# Patient Record
Sex: FEMALE | Race: OTHER | NOT HISPANIC OR LATINO | Employment: STUDENT | URBAN - METROPOLITAN AREA
[De-identification: names, ages, dates, MRNs, and addresses within clinical notes are randomized per-mention and may not be internally consistent; named-entity substitution may affect disease eponyms.]

---

## 2023-03-07 PROBLEM — F41.1 ANXIETY STATE: Status: ACTIVE | Noted: 2021-10-20

## 2023-03-07 PROBLEM — J02.9 ACUTE PHARYNGITIS: Status: RESOLVED | Noted: 2023-03-07 | Resolved: 2023-03-07

## 2023-03-07 PROBLEM — L70.9 ACNE: Status: ACTIVE | Noted: 2022-10-11

## 2023-03-07 PROBLEM — R61 GENERALIZED HYPERHIDROSIS: Status: ACTIVE | Noted: 2021-10-20

## 2023-03-07 PROBLEM — F90.0 ATTENTION DEFICIT HYPERACTIVITY DISORDER, PREDOMINANTLY INATTENTIVE TYPE: Status: ACTIVE | Noted: 2021-10-20

## 2023-03-07 RX ORDER — DEXTROMETHORPHAN HB/DOXYLAMINE 15-6.25/15
1 SOLUTION, ORAL ORAL DAILY
COMMUNITY
Start: 2022-11-15

## 2023-03-07 RX ORDER — ALUMINUM CHLORIDE 20 %
SOLUTION, NON-ORAL TOPICAL DAILY
COMMUNITY
Start: 2021-10-20

## 2023-03-07 RX ORDER — DEXTROAMPHETAMINE SACCHARATE, AMPHETAMINE ASPARTATE, DEXTROAMPHETAMINE SULFATE AND AMPHETAMINE SULFATE 2.5; 2.5; 2.5; 2.5 MG/1; MG/1; MG/1; MG/1
1 TABLET ORAL 2 TIMES DAILY
COMMUNITY
End: 2023-03-16 | Stop reason: SDUPTHER

## 2023-03-07 RX ORDER — CLINDAMYCIN 1 G/10ML
GEL TOPICAL 2 TIMES DAILY
COMMUNITY
Start: 2022-10-11

## 2023-03-07 RX ORDER — TRAZODONE HYDROCHLORIDE 50 MG/1
50 TABLET ORAL NIGHTLY
COMMUNITY
Start: 2021-10-20 | End: 2023-09-20 | Stop reason: SDUPTHER

## 2023-03-07 RX ORDER — ALBUTEROL SULFATE 0.83 MG/ML
2.5 SOLUTION RESPIRATORY (INHALATION) EVERY 4 HOURS PRN
COMMUNITY
Start: 2022-11-17

## 2023-03-07 RX ORDER — CITALOPRAM 20 MG/1
20 TABLET, FILM COATED ORAL DAILY
COMMUNITY
Start: 2022-03-24 | End: 2023-03-16 | Stop reason: SDUPTHER

## 2023-03-07 RX ORDER — DEXTROAMPHETAMINE SACCHARATE, AMPHETAMINE ASPARTATE, DEXTROAMPHETAMINE SULFATE AND AMPHETAMINE SULFATE 2.5; 2.5; 2.5; 2.5 MG/1; MG/1; MG/1; MG/1
1 TABLET ORAL 2 TIMES DAILY
OUTPATIENT
Start: 2023-03-07

## 2023-03-07 NOTE — TELEPHONE ENCOUNTER
Bassem Naik, I'll need to see you for a telehealth visit to refill this controlled med.  My staff will help set this up for you.

## 2023-03-16 ENCOUNTER — TELEMEDICINE (OUTPATIENT)
Dept: PRIMARY CARE | Facility: CLINIC | Age: 28
End: 2023-03-16
Payer: COMMERCIAL

## 2023-03-16 DIAGNOSIS — F90.0 ATTENTION DEFICIT HYPERACTIVITY DISORDER, PREDOMINANTLY INATTENTIVE TYPE: Primary | ICD-10-CM

## 2023-03-16 PROCEDURE — 99213 OFFICE O/P EST LOW 20 MIN: CPT | Performed by: FAMILY MEDICINE

## 2023-03-16 RX ORDER — DEXTROAMPHETAMINE SACCHARATE, AMPHETAMINE ASPARTATE, DEXTROAMPHETAMINE SULFATE AND AMPHETAMINE SULFATE 2.5; 2.5; 2.5; 2.5 MG/1; MG/1; MG/1; MG/1
1 TABLET ORAL 2 TIMES DAILY
Qty: 60 TABLET | Refills: 0 | Status: SHIPPED | OUTPATIENT
Start: 2023-03-16 | End: 2023-03-20 | Stop reason: RX

## 2023-03-16 RX ORDER — DEXTROAMPHETAMINE SACCHARATE, AMPHETAMINE ASPARTATE, DEXTROAMPHETAMINE SULFATE AND AMPHETAMINE SULFATE 2.5; 2.5; 2.5; 2.5 MG/1; MG/1; MG/1; MG/1
1 TABLET ORAL 2 TIMES DAILY
Qty: 60 TABLET | Refills: 0 | Status: SHIPPED | OUTPATIENT
Start: 2023-04-15 | End: 2023-03-20 | Stop reason: SDUPTHER

## 2023-03-16 RX ORDER — DEXTROAMPHETAMINE SACCHARATE, AMPHETAMINE ASPARTATE, DEXTROAMPHETAMINE SULFATE AND AMPHETAMINE SULFATE 2.5; 2.5; 2.5; 2.5 MG/1; MG/1; MG/1; MG/1
1 TABLET ORAL 2 TIMES DAILY
Qty: 60 TABLET | Refills: 0 | Status: SHIPPED | OUTPATIENT
Start: 2023-06-15 | End: 2023-03-20 | Stop reason: SDUPTHER

## 2023-03-16 RX ORDER — CITALOPRAM 20 MG/1
20 TABLET, FILM COATED ORAL DAILY
Qty: 90 TABLET | Refills: 3 | Status: SHIPPED | OUTPATIENT
Start: 2023-03-16 | End: 2023-09-20 | Stop reason: SDUPTHER

## 2023-03-16 NOTE — PROGRESS NOTES
Subjective   Patient ID: Heena Varner is a 28 y.o. female who presents for No chief complaint on file..  HPI patient ADD medications she has been out for couple weeks now.  Harder time focusing in class.  Reports acting impulsively and dealing with hyperactivity.    Current Outpatient Medications on File Prior to Visit   Medication Sig Dispense Refill    albuterol 2.5 mg /3 mL (0.083 %) nebulizer solution Take 3 mL (2.5 mg) by nebulization every 4 hours if needed (Asthma).      amphetamine-dextroamphetamine (Adderall) 10 mg tablet Take 1 tablet (10 mg) by mouth in the morning and 1 tablet (10 mg) before bedtime.      citalopram (CeleXA) 20 mg tablet Take 1 tablet (20 mg) by mouth once daily.      Clindagel 1 % gel, once daily Apply topically 2 times a day.      Drysol Dab-O-Matic 20 % external solution Apply topically once daily. apply to axilla      loratadine-pseudoephedrine (Loratadine-D)  mg 24 hr tablet Take 1 tablet by mouth once daily.      traZODone (Desyrel) 50 mg tablet Take 1 tablet (50 mg) by mouth once daily at bedtime.       No current facility-administered medications on file prior to visit.      This is a videochat visit.  Obtained consent from patient to have a video chat.  Total time spent in video chat was 8 mins.    Physical exam:  GEN: well appearing patient, no acute distress  HEENT: normal appearing sclera   NEURO: CN appear intact grossly  PSYCH: answering questions appropriately, normal mood    Review of Systems    Objective   Physical Exam    No visits with results within 2 Month(s) from this visit.   Latest known visit with results is:   [facility]acy Encounter on 12/05/2022   Component Date Value Ref Range Status    Flu A Result 12/05/2022 NOT DETECTED  Not Detected Final    Comment:  Respiratory virus testing is performed routinely by PCR    for Influenza A/B and RSV. If Influenza and RSV PCR are    negative, testing for parainfluenza 1,2,3 viruses and    adenovirus is routinely  performed for oncology inpatients    and intensive care unit patients at [facility] and is available   on request on other patients by calling Laboratory Client   Services at [phone].   Not Detected results do not preclude Influenza A/B or RSV   infections since the adequacy of sample collection or low   viral burden may impact the clinical sensitivity of this   test method.      Flu B Result 12/05/2022 NOT DETECTED  Not Detected Final    Comment:  Respiratory virus testing is performed routinely by PCR    for Influenza A/B and RSV. If Influenza and RSV PCR are    negative, testing for parainfluenza 1,2,3 viruses and    adenovirus is routinely performed for oncology inpatients    and intensive care unit patients at [facility] and is available   on request on other patients by calling Laboratory Client   Services at [phone]   Not Detected results do not preclude Influenza A/B or RSV   infections since the adequacy of sample collection or low   viral burden may impact the clinical sensitivity of this   test method.      SARS-COV-2 RESULT 12/05/2022 NOT DETECTED  Not Detected Final    Comment: .  This assay is designed to detect the ORF1a/b and E genes of SARS-CoV-2 via   nucleic acid amplification. A Not Detected result does not preclude   2019-nCoV infection since the adequacy of sample collection and/or low viral   burden may result in presence of viral nucleic acids below the clinical   sensitivity of this test method.     Fact sheet for providers: https://www.fda.gov/media/653152/download   Fact sheet for patients: https://www.fda.gov/media/213389/download     This test has received FDA Emergency Use Authorization (EUA) and has been   verified for use by [facility] ([facility]).   This test is only authorized for the duration of time that circumstances   exist to justify the authorization of the emergency use of in vitro   diagnostic tests for the detection of SARS-CoV-2 virus and/or  diagnosis of   COVID-19 infection under section 564(b)(1) of the Act, 21 U.S.C.   360bbb-3(b)(1), unless the authorization is terminated or revoked sooner.                               [facility] is certified under CLIA-88 as   qualified to perform high complexity testing. Testing is performed in the   [facility] laboratories located at [address] [place] [address].         Assessment/Plan   Problem List Items Addressed This Visit          Other    Attention deficit hyperactivity disorder, predominantly inattentive type - Primary    Relevant Medications    amphetamine-dextroamphetamine (Adderall) 10 mg tablet    amphetamine-dextroamphetamine (Adderall) 10 mg tablet (Start on 4/15/2023)    amphetamine-dextroamphetamine (Adderall) 10 mg tablet (Start on 6/15/2023)    citalopram (CeleXA) 20 mg tablet   I personally reviewed the OARRS report for this patient. I have considered the risks of abuse, dependence, addiction, and diversion. I believe that it is clinically appropriate for this patient to be prescribed this medication based on documented diagnosis.     Patient is doing well.  Refilled pts meds.  Follow up in 3 mo.

## 2023-03-20 ENCOUNTER — TELEPHONE (OUTPATIENT)
Dept: PRIMARY CARE | Facility: CLINIC | Age: 28
End: 2023-03-20
Payer: COMMERCIAL

## 2023-03-20 ENCOUNTER — NURSE TRIAGE (OUTPATIENT)
Dept: PRIMARY CARE | Facility: CLINIC | Age: 28
End: 2023-03-20
Payer: COMMERCIAL

## 2023-03-20 DIAGNOSIS — F90.0 ATTENTION DEFICIT HYPERACTIVITY DISORDER, PREDOMINANTLY INATTENTIVE TYPE: ICD-10-CM

## 2023-03-20 RX ORDER — DEXTROAMPHETAMINE SACCHARATE, AMPHETAMINE ASPARTATE, DEXTROAMPHETAMINE SULFATE AND AMPHETAMINE SULFATE 2.5; 2.5; 2.5; 2.5 MG/1; MG/1; MG/1; MG/1
1 TABLET ORAL 2 TIMES DAILY
Qty: 60 TABLET | Refills: 0 | Status: SHIPPED | OUTPATIENT
Start: 2023-03-20 | End: 2023-06-21 | Stop reason: ALTCHOICE

## 2023-03-20 RX ORDER — DEXTROAMPHETAMINE SACCHARATE, AMPHETAMINE ASPARTATE, DEXTROAMPHETAMINE SULFATE AND AMPHETAMINE SULFATE 2.5; 2.5; 2.5; 2.5 MG/1; MG/1; MG/1; MG/1
10 TABLET ORAL DAILY
Qty: 30 TABLET | Refills: 0 | Status: SHIPPED | OUTPATIENT
Start: 2023-04-19 | End: 2023-03-20 | Stop reason: SDUPTHER

## 2023-03-20 RX ORDER — DEXTROAMPHETAMINE SACCHARATE, AMPHETAMINE ASPARTATE, DEXTROAMPHETAMINE SULFATE AND AMPHETAMINE SULFATE 2.5; 2.5; 2.5; 2.5 MG/1; MG/1; MG/1; MG/1
10 TABLET ORAL DAILY
Qty: 30 TABLET | Refills: 0 | Status: SHIPPED | OUTPATIENT
Start: 2023-05-19 | End: 2023-03-20 | Stop reason: SDUPTHER

## 2023-03-20 RX ORDER — DEXTROAMPHETAMINE SACCHARATE, AMPHETAMINE ASPARTATE, DEXTROAMPHETAMINE SULFATE AND AMPHETAMINE SULFATE 2.5; 2.5; 2.5; 2.5 MG/1; MG/1; MG/1; MG/1
1 TABLET ORAL 2 TIMES DAILY
Qty: 60 TABLET | Refills: 0 | Status: SHIPPED | OUTPATIENT
Start: 2023-06-15 | End: 2023-06-21 | Stop reason: SDUPTHER

## 2023-03-20 RX ORDER — DEXTROAMPHETAMINE SACCHARATE, AMPHETAMINE ASPARTATE, DEXTROAMPHETAMINE SULFATE AND AMPHETAMINE SULFATE 2.5; 2.5; 2.5; 2.5 MG/1; MG/1; MG/1; MG/1
10 TABLET ORAL DAILY
Qty: 30 TABLET | Refills: 0 | Status: SHIPPED | OUTPATIENT
Start: 2023-05-19 | End: 2023-06-21 | Stop reason: ALTCHOICE

## 2023-03-20 RX ORDER — DEXTROAMPHETAMINE SACCHARATE, AMPHETAMINE ASPARTATE, DEXTROAMPHETAMINE SULFATE AND AMPHETAMINE SULFATE 2.5; 2.5; 2.5; 2.5 MG/1; MG/1; MG/1; MG/1
1 TABLET ORAL 2 TIMES DAILY
Qty: 60 TABLET | Refills: 0 | Status: CANCELLED | OUTPATIENT
Start: 2023-03-20 | End: 2023-04-19

## 2023-03-20 RX ORDER — DEXTROAMPHETAMINE SACCHARATE, AMPHETAMINE ASPARTATE, DEXTROAMPHETAMINE SULFATE AND AMPHETAMINE SULFATE 2.5; 2.5; 2.5; 2.5 MG/1; MG/1; MG/1; MG/1
10 TABLET ORAL DAILY
Qty: 30 TABLET | Refills: 0 | Status: SHIPPED | OUTPATIENT
Start: 2023-03-20 | End: 2023-06-21 | Stop reason: SDUPTHER

## 2023-03-20 RX ORDER — DEXTROAMPHETAMINE SACCHARATE, AMPHETAMINE ASPARTATE, DEXTROAMPHETAMINE SULFATE AND AMPHETAMINE SULFATE 2.5; 2.5; 2.5; 2.5 MG/1; MG/1; MG/1; MG/1
1 TABLET ORAL 2 TIMES DAILY
Qty: 60 TABLET | Refills: 0 | Status: SHIPPED | OUTPATIENT
Start: 2023-03-20 | End: 2023-03-20 | Stop reason: SDUPTHER

## 2023-03-20 RX ORDER — DEXTROAMPHETAMINE SACCHARATE, AMPHETAMINE ASPARTATE, DEXTROAMPHETAMINE SULFATE AND AMPHETAMINE SULFATE 2.5; 2.5; 2.5; 2.5 MG/1; MG/1; MG/1; MG/1
10 TABLET ORAL DAILY
Qty: 30 TABLET | Refills: 0 | Status: SHIPPED | OUTPATIENT
Start: 2023-04-19 | End: 2023-06-21 | Stop reason: ALTCHOICE

## 2023-03-20 RX ORDER — DEXTROAMPHETAMINE SACCHARATE, AMPHETAMINE ASPARTATE, DEXTROAMPHETAMINE SULFATE AND AMPHETAMINE SULFATE 2.5; 2.5; 2.5; 2.5 MG/1; MG/1; MG/1; MG/1
1 TABLET ORAL 2 TIMES DAILY
Qty: 60 TABLET | Refills: 0 | Status: SHIPPED | OUTPATIENT
Start: 2023-04-15 | End: 2023-06-21 | Stop reason: SDUPTHER

## 2023-03-20 NOTE — TELEPHONE ENCOUNTER
Pt called back stating she found that [facility] has her regular rx and would like all 3 meds her, orignal rx's to go there. Giovanny SEVILLA

## 2023-03-20 NOTE — TELEPHONE ENCOUNTER
Pt wants Adderral - DIEGO, not amphetamine- dex, it MUST say Adderall only, no generic name on RX. It needs to say DIEGO Adderall. ROEL CARRILLO

## 2023-03-20 NOTE — TELEPHONE ENCOUNTER
Patient states her pharmacy does not have Generic Adderall 10 mg. Patient is asking if [facility] could send it DIEGO to [facility] [place] on [facility]

## 2023-03-27 ENCOUNTER — APPOINTMENT (OUTPATIENT)
Dept: PRIMARY CARE | Facility: CLINIC | Age: 28
End: 2023-03-27
Payer: COMMERCIAL

## 2023-06-21 ENCOUNTER — OFFICE VISIT (OUTPATIENT)
Dept: PRIMARY CARE | Facility: CLINIC | Age: 28
End: 2023-06-21
Payer: COMMERCIAL

## 2023-06-21 VITALS
DIASTOLIC BLOOD PRESSURE: 74 MMHG | WEIGHT: 116 LBS | SYSTOLIC BLOOD PRESSURE: 122 MMHG | BODY MASS INDEX: 19.91 KG/M2 | HEART RATE: 82 BPM | TEMPERATURE: 98.5 F

## 2023-06-21 DIAGNOSIS — K64.1 GRADE II HEMORRHOIDS: Primary | ICD-10-CM

## 2023-06-21 DIAGNOSIS — F90.0 ATTENTION DEFICIT HYPERACTIVITY DISORDER, PREDOMINANTLY INATTENTIVE TYPE: ICD-10-CM

## 2023-06-21 PROBLEM — F32.A DEPRESSION: Status: ACTIVE | Noted: 2023-06-21

## 2023-06-21 PROCEDURE — 1036F TOBACCO NON-USER: CPT | Performed by: FAMILY MEDICINE

## 2023-06-21 PROCEDURE — 99203 OFFICE O/P NEW LOW 30 MIN: CPT | Performed by: FAMILY MEDICINE

## 2023-06-21 RX ORDER — DEXTROAMPHETAMINE SACCHARATE, AMPHETAMINE ASPARTATE, DEXTROAMPHETAMINE SULFATE AND AMPHETAMINE SULFATE 2.5; 2.5; 2.5; 2.5 MG/1; MG/1; MG/1; MG/1
1 TABLET ORAL 2 TIMES DAILY
Qty: 60 TABLET | Refills: 0 | Status: SHIPPED | OUTPATIENT
Start: 2023-07-20 | End: 2023-09-20 | Stop reason: ALTCHOICE

## 2023-06-21 RX ORDER — LEVONORGESTREL AND ETHINYL ESTRADIOL 0.1-0.02MG
KIT ORAL
COMMUNITY
Start: 2022-02-10

## 2023-06-21 RX ORDER — DEXTROAMPHETAMINE SACCHARATE, AMPHETAMINE ASPARTATE, DEXTROAMPHETAMINE SULFATE AND AMPHETAMINE SULFATE 2.5; 2.5; 2.5; 2.5 MG/1; MG/1; MG/1; MG/1
10 TABLET ORAL DAILY
Qty: 30 TABLET | Refills: 0 | Status: SHIPPED | OUTPATIENT
Start: 2023-08-19 | End: 2023-09-20 | Stop reason: ALTCHOICE

## 2023-06-21 RX ORDER — TAZAROTENE 1 MG/G
GEL TOPICAL
COMMUNITY
Start: 2023-05-23

## 2023-06-21 RX ORDER — SULFACETAMIDE SODIUM, SULFUR 100; 50 MG/G; MG/G
EMULSION TOPICAL
COMMUNITY
Start: 2023-05-23

## 2023-06-21 RX ORDER — DEXTROAMPHETAMINE SACCHARATE, AMPHETAMINE ASPARTATE, DEXTROAMPHETAMINE SULFATE AND AMPHETAMINE SULFATE 2.5; 2.5; 2.5; 2.5 MG/1; MG/1; MG/1; MG/1
1 TABLET ORAL 2 TIMES DAILY
Qty: 60 TABLET | Refills: 0 | Status: SHIPPED | OUTPATIENT
Start: 2023-06-21 | End: 2023-09-20 | Stop reason: ALTCHOICE

## 2023-06-21 NOTE — PROGRESS NOTES
Subjective   Patient ID: Heena Varner is a 28 y.o. female who presents for ADD (Recheck.) and ER Follow-up ([facility] on 3/30 Re: Hemorrhoid, rectal bleeding).    Patient ADD medications she has been out for couple weeks now.  Harder time focusing in class.  Reports acting impulsively and dealing with hyperactivity.    Hemorrhoid: has been having multiple episodes of bleeding from a hemorrhoid.    Current Outpatient Medications on File Prior to Visit   Medication Sig Dispense Refill    albuterol 2.5 mg /3 mL (0.083 %) nebulizer solution Take 3 mL (2.5 mg) by nebulization every 4 hours if needed (Asthma).      Aviane 0.1-20 mg-mcg tablet Take by mouth.      citalopram (CeleXA) 20 mg tablet Take 1 tablet (20 mg) by mouth once daily. 90 tablet 3    sulfacetamide sodium-sulfur (Avar, Plexion) 10-5 % (w/w) topical emulsion wash face twice daily as directed      tazarotene (Tazorac) 0.1 % gel apply to face every night sparingly      traZODone (Desyrel) 50 mg tablet Take 1 tablet (50 mg) by mouth once daily at bedtime.      [DISCONTINUED] amphetamine-dextroamphetamine (Adderall) 10 mg tablet Take 1 tablet (10 mg) by mouth in the morning and 1 tablet (10 mg) before bedtime. Do not start before Carmen 15, 2023. 60 tablet 0    Clindagel 1 % gel, once daily Apply topically 2 times a day.      Drysol Dab-O-Matic 20 % external solution Apply topically once daily. apply to axilla      loratadine-pseudoephedrine (Loratadine-D)  mg 24 hr tablet Take 1 tablet by mouth once daily.      [DISCONTINUED] amphetamine-dextroamphetamine (Adderall) 10 mg tablet Take 1 tablet (10 mg) by mouth in the morning and 1 tablet (10 mg) before bedtime. Do not start before April 15, 2023. 60 tablet 0    [DISCONTINUED] amphetamine-dextroamphetamine (Adderall) 10 mg tablet Take 1 tablet (10 mg) by mouth once daily. 30 tablet 0    [DISCONTINUED] amphetamine-dextroamphetamine (Adderall) 10 mg tablet Take 1 tablet (10 mg) by mouth in the morning  and 1 tablet (10 mg) before bedtime. 60 tablet 0    [DISCONTINUED] amphetamine-dextroamphetamine (Adderall) 10 mg tablet Take 1 tablet (10 mg) by mouth once daily. Do not start before April 19, 2023. 30 tablet 0    [DISCONTINUED] amphetamine-dextroamphetamine (Adderall) 10 mg tablet Take 1 tablet (10 mg) by mouth once daily. Do not start before May 19, 2023. 30 tablet 0     No current facility-administered medications on file prior to visit.        Vitals:    06/21/23 0847   BP: 122/74   Pulse: 82   Temp: 36.9 °C (98.5 °F)       Review of Systems   All other systems reviewed and are negative.      Objective     Physical Exam  Constitutional:       Appearance: Normal appearance. She is well-developed.   HENT:      Head: Atraumatic.   Cardiovascular:      Rate and Rhythm: Normal rate and regular rhythm.      Heart sounds: Normal heart sounds. No murmur heard.  Pulmonary:      Effort: Pulmonary effort is normal.      Breath sounds: Normal breath sounds.   Abdominal:      General: Bowel sounds are normal.      Palpations: Abdomen is soft.   Skin:     General: Skin is warm.   Neurological:      General: No focal deficit present.      Mental Status: She is alert.   Psychiatric:         Mood and Affect: Mood normal.         No visits with results within 2 Month(s) from this visit.   Latest known visit with results is:   Legacy Encounter on 12/05/2022   Component Date Value Ref Range Status    Flu A Result 12/05/2022 NOT DETECTED  Not Detected Final    Comment:  Respiratory virus testing is performed routinely by PCR    for Influenza A/B and RSV. If Influenza and RSV PCR are    negative, testing for parainfluenza 1,2,3 viruses and    adenovirus is routinely performed for oncology inpatients    and intensive care unit patients at [facility] and is available   on request on other patients by calling Laboratory Client   Services at [phone].   Not Detected results do not preclude Influenza A/B or RSV   infections since the  adequacy of sample collection or low   viral burden may impact the clinical sensitivity of this   test method.      Flu B Result 12/05/2022 NOT DETECTED  Not Detected Final    Comment:  Respiratory virus testing is performed routinely by PCR    for Influenza A/B and RSV. If Influenza and RSV PCR are    negative, testing for parainfluenza 1,2,3 viruses and    adenovirus is routinely performed for oncology inpatients    and intensive care unit patients at [facility] and is available   on request on other patients by calling Laboratory Client   Services at [phone]   Not Detected results do not preclude Influenza A/B or RSV   infections since the adequacy of sample collection or low   viral burden may impact the clinical sensitivity of this   test method.      SARS-COV-2 RESULT 12/05/2022 NOT DETECTED  Not Detected Final    Comment: .  This assay is designed to detect the ORF1a/b and E genes of SARS-CoV-2 via   nucleic acid amplification. A Not Detected result does not preclude   2019-nCoV infection since the adequacy of sample collection and/or low viral   burden may result in presence of viral nucleic acids below the clinical   sensitivity of this test method.     Fact sheet for providers: https://www.fda.gov/media/750662/download   Fact sheet for patients: https://www.fda.gov/media/877724/download     This test has received FDA Emergency Use Authorization (EUA) and has been   verified for use by [facility] ([facility]).   This test is only authorized for the duration of time that circumstances   exist to justify the authorization of the emergency use of in vitro   diagnostic tests for the detection of SARS-CoV-2 virus and/or diagnosis of   COVID-19 infection under section 564(b)(1) of the Act, 21 U.S.C.   360bbb-3(b)(1), unless the authorization is terminated or revoked sooner.                               [facility] is certified under CLIA-88 as    qualified to perform high complexity testing. Testing is performed in the   [facility] laboratories located at [address] [place] [address].         Assessment/Plan   Problem List Items Addressed This Visit       Attention deficit hyperactivity disorder, predominantly inattentive type    Relevant Medications    amphetamine-dextroamphetamine (Adderall) 10 mg tablet    amphetamine-dextroamphetamine (Adderall) 10 mg tablet (Start on 7/20/2023)    amphetamine-dextroamphetamine (Adderall) 10 mg tablet (Start on 8/19/2023)     Other Visit Diagnoses       Grade II hemorrhoids    -  Primary    Relevant Orders    Referral to General Surgery          I personally reviewed the OARRS report for this patient. I have considered the risks of abuse, dependence, addiction, and diversion. I believe that it is clinically appropriate for this patient to be prescribed this medication based on documented diagnosis.     Patient is doing well.  Refilled pts meds.  Follow up in 3 mo.

## 2023-09-20 ENCOUNTER — TELEMEDICINE (OUTPATIENT)
Dept: PRIMARY CARE | Facility: CLINIC | Age: 28
End: 2023-09-20
Payer: COMMERCIAL

## 2023-09-20 DIAGNOSIS — F51.01 PRIMARY INSOMNIA: Primary | ICD-10-CM

## 2023-09-20 DIAGNOSIS — F90.0 ATTENTION DEFICIT HYPERACTIVITY DISORDER, PREDOMINANTLY INATTENTIVE TYPE: ICD-10-CM

## 2023-09-20 PROCEDURE — 99214 OFFICE O/P EST MOD 30 MIN: CPT | Performed by: FAMILY MEDICINE

## 2023-09-20 RX ORDER — DEXTROAMPHETAMINE SACCHARATE, AMPHETAMINE ASPARTATE, DEXTROAMPHETAMINE SULFATE AND AMPHETAMINE SULFATE 3.75; 3.75; 3.75; 3.75 MG/1; MG/1; MG/1; MG/1
15 TABLET ORAL
Qty: 60 TABLET | Refills: 0 | Status: SHIPPED | OUTPATIENT
Start: 2023-10-20 | End: 2023-11-07 | Stop reason: ALTCHOICE

## 2023-09-20 RX ORDER — DEXTROAMPHETAMINE SACCHARATE, AMPHETAMINE ASPARTATE, DEXTROAMPHETAMINE SULFATE AND AMPHETAMINE SULFATE 3.75; 3.75; 3.75; 3.75 MG/1; MG/1; MG/1; MG/1
15 TABLET ORAL
Qty: 60 TABLET | Refills: 0 | Status: SHIPPED | OUTPATIENT
Start: 2023-11-19 | End: 2023-12-13 | Stop reason: SDUPTHER

## 2023-09-20 RX ORDER — TRAZODONE HYDROCHLORIDE 50 MG/1
50 TABLET ORAL NIGHTLY
Qty: 90 TABLET | Refills: 1 | Status: SHIPPED | OUTPATIENT
Start: 2023-09-20 | End: 2024-04-08

## 2023-09-20 RX ORDER — DEXTROAMPHETAMINE SACCHARATE, AMPHETAMINE ASPARTATE, DEXTROAMPHETAMINE SULFATE AND AMPHETAMINE SULFATE 3.75; 3.75; 3.75; 3.75 MG/1; MG/1; MG/1; MG/1
15 TABLET ORAL
Qty: 60 TABLET | Refills: 0 | Status: SHIPPED | OUTPATIENT
Start: 2023-09-20 | End: 2023-11-07 | Stop reason: ALTCHOICE

## 2023-09-20 RX ORDER — CITALOPRAM 20 MG/1
20 TABLET, FILM COATED ORAL DAILY
Qty: 90 TABLET | Refills: 3 | Status: SHIPPED | OUTPATIENT
Start: 2023-09-20 | End: 2024-09-19

## 2023-09-20 NOTE — PROGRESS NOTES
Subjective   Patient ID: Heena Varner is a 28 y.o. female who presents for No chief complaint on file..    Patient ADD medications: Having a hard time focusing even when she takes medications.  Tends to run out at the end of the day.    Hemorrhoid: not discussed    This is a videochat visit.  Obtained consent from patient to have a video chat.  Total time spent in video chat was 8 mins.    Physical exam:  GEN: well appearing patient, no acute distress  HEENT: normal appearing sclera   NEURO: CN appear intact grossly  PSYCH: answering questions appropriately, normal mood      Current Outpatient Medications on File Prior to Visit   Medication Sig Dispense Refill    albuterol 2.5 mg /3 mL (0.083 %) nebulizer solution Take 3 mL (2.5 mg) by nebulization every 4 hours if needed (Asthma).      Aviane 0.1-20 mg-mcg tablet Take by mouth.      Clindagel 1 % gel, once daily Apply topically 2 times a day.      Drysol Dab-O-Matic 20 % external solution Apply topically once daily. apply to axilla      loratadine-pseudoephedrine (Loratadine-D)  mg 24 hr tablet Take 1 tablet by mouth once daily.      sulfacetamide sodium-sulfur (Avar, Plexion) 10-5 % (w/w) topical emulsion wash face twice daily as directed      tazarotene (Tazorac) 0.1 % gel apply to face every night sparingly      [DISCONTINUED] amphetamine-dextroamphetamine (Adderall) 10 mg tablet Take 1 tablet (10 mg) by mouth 2 times a day. 60 tablet 0    [DISCONTINUED] amphetamine-dextroamphetamine (Adderall) 10 mg tablet Take 1 tablet (10 mg) by mouth 2 times a day. Do not start before July 20, 2023. 60 tablet 0    [DISCONTINUED] amphetamine-dextroamphetamine (Adderall) 10 mg tablet Take 1 tablet (10 mg) by mouth once daily. Do not start before August 19, 2023. 30 tablet 0    [DISCONTINUED] citalopram (CeleXA) 20 mg tablet Take 1 tablet (20 mg) by mouth once daily. 90 tablet 3    [DISCONTINUED] traZODone (Desyrel) 50 mg tablet Take 1 tablet (50 mg) by mouth once daily  at bedtime.       No current facility-administered medications on file prior to visit.        There were no vitals filed for this visit.      Review of Systems   All other systems reviewed and are negative.      Objective     No visits with results within 2 Month(s) from this visit.   Latest known visit with results is:   Legacy Encounter on 12/05/2022   Component Date Value Ref Range Status    Flu A Result 12/05/2022 NOT DETECTED  Not Detected Final    Comment:  Respiratory virus testing is performed routinely by PCR    for Influenza A/B and RSV. If Influenza and RSV PCR are    negative, testing for parainfluenza 1,2,3 viruses and    adenovirus is routinely performed for oncology inpatients    and intensive care unit patients at [facility] and is available   on request on other patients by calling Laboratory Client   Services at [phone].   Not Detected results do not preclude Influenza A/B or RSV   infections since the adequacy of sample collection or low   viral burden may impact the clinical sensitivity of this   test method.      Flu B Result 12/05/2022 NOT DETECTED  Not Detected Final    Comment:  Respiratory virus testing is performed routinely by PCR    for Influenza A/B and RSV. If Influenza and RSV PCR are    negative, testing for parainfluenza 1,2,3 viruses and    adenovirus is routinely performed for oncology inpatients    and intensive care unit patients at [facility] and is available   on request on other patients by calling Laboratory Client   Services at [phone]   Not Detected results do not preclude Influenza A/B or RSV   infections since the adequacy of sample collection or low   viral burden may impact the clinical sensitivity of this   test method.      SARS-CoV-2 Result 12/05/2022 NOT DETECTED  Not Detected Final    Comment: .  This assay is designed to detect the ORF1a/b and E genes of SARS-CoV-2 via   nucleic acid amplification. A Not Detected result does not preclude   2019-nCoV infection  since the adequacy of sample collection and/or low viral   burden may result in presence of viral nucleic acids below the clinical   sensitivity of this test method.     Fact sheet for providers: https://www.fda.gov/media/958612/download   Fact sheet for patients: https://www.fda.gov/media/017179/download     This test has received FDA Emergency Use Authorization (EUA) and has been   verified for use by [facility] ([facility]).   This test is only authorized for the duration of time that circumstances   exist to justify the authorization of the emergency use of in vitro   diagnostic tests for the detection of SARS-CoV-2 virus and/or diagnosis of   COVID-19 infection under section 564(b)(1) of the Act, 21 U.S.C.   360bbb-3(b)(1), unless the authorization is terminated or revoked sooner.                               [facility] is certified under CLIA-88 as   qualified to perform high complexity testing. Testing is performed in the   [facility] laboratories located at [address] [place] [address].         Assessment/Plan   Problem List Items Addressed This Visit       Attention deficit hyperactivity disorder, predominantly inattentive type    Relevant Medications    amphetamine-dextroamphetamine (Adderall) 15 mg tablet    amphetamine-dextroamphetamine (Adderall) 15 mg tablet (Start on 10/20/2023)    amphetamine-dextroamphetamine (Adderall) 15 mg tablet (Start on 11/19/2023)    citalopram (CeleXA) 20 mg tablet     Other Visit Diagnoses       Primary insomnia    -  Primary    Relevant Medications    traZODone (Desyrel) 50 mg tablet          I personally reviewed the OARRS report for this patient. I have considered the risks of abuse, dependence, addiction, and diversion. I believe that it is clinically appropriate for this patient to be prescribed this medication based on documented diagnosis.     Inc to 15 mg BID.  Take some drug holidays.  Follow up  in 3 mo.

## 2023-11-07 ENCOUNTER — TELEPHONE (OUTPATIENT)
Dept: PRIMARY CARE | Facility: CLINIC | Age: 28
End: 2023-11-07
Payer: COMMERCIAL

## 2023-11-07 DIAGNOSIS — F90.0 ATTENTION DEFICIT HYPERACTIVITY DISORDER, PREDOMINANTLY INATTENTIVE TYPE: Primary | ICD-10-CM

## 2023-11-07 RX ORDER — DEXTROAMPHETAMINE SACCHARATE, AMPHETAMINE ASPARTATE, DEXTROAMPHETAMINE SULFATE AND AMPHETAMINE SULFATE 7.5; 7.5; 7.5; 7.5 MG/1; MG/1; MG/1; MG/1
15 TABLET ORAL
Qty: 30 TABLET | Refills: 0 | Status: SHIPPED | OUTPATIENT
Start: 2023-11-07 | End: 2023-12-12 | Stop reason: HOSPADM

## 2023-11-07 NOTE — TELEPHONE ENCOUNTER
Pt called and stated that pharmacy doesn't have generic adderall in 15mg, they said they can do 30mg once a day if that can be sent over.

## 2023-11-17 ENCOUNTER — OFFICE VISIT (OUTPATIENT)
Dept: SURGERY | Facility: CLINIC | Age: 28
End: 2023-11-17
Payer: COMMERCIAL

## 2023-11-17 VITALS
BODY MASS INDEX: 20.45 KG/M2 | DIASTOLIC BLOOD PRESSURE: 84 MMHG | SYSTOLIC BLOOD PRESSURE: 129 MMHG | WEIGHT: 119.8 LBS | HEIGHT: 64 IN | OXYGEN SATURATION: 99 % | HEART RATE: 101 BPM

## 2023-11-17 DIAGNOSIS — K64.4 INTERNAL AND EXTERNAL BLEEDING HEMORRHOIDS: ICD-10-CM

## 2023-11-17 DIAGNOSIS — K62.5 RECTAL BLEEDING: Primary | ICD-10-CM

## 2023-11-17 DIAGNOSIS — K64.8 INTERNAL AND EXTERNAL BLEEDING HEMORRHOIDS: ICD-10-CM

## 2023-11-17 PROCEDURE — 1036F TOBACCO NON-USER: CPT | Performed by: SURGERY

## 2023-11-17 PROCEDURE — 46600 DIAGNOSTIC ANOSCOPY SPX: CPT | Performed by: SURGERY

## 2023-11-17 PROCEDURE — 99214 OFFICE O/P EST MOD 30 MIN: CPT | Performed by: SURGERY

## 2023-11-17 RX ORDER — SODIUM CHLORIDE 9 MG/ML
100 INJECTION, SOLUTION INTRAVENOUS CONTINUOUS
Status: CANCELLED | OUTPATIENT
Start: 2023-11-17

## 2023-11-17 ASSESSMENT — ENCOUNTER SYMPTOMS
ANAL BLEEDING: 1
DIARRHEA: 0
VOMITING: 0
ABDOMINAL PAIN: 0
HEADACHES: 0
SHORTNESS OF BREATH: 0
COUGH: 0
BLOOD IN STOOL: 0
RECTAL PAIN: 0
DIZZINESS: 0
CONSTIPATION: 0
FEVER: 0
NAUSEA: 0
PALPITATIONS: 0
CHILLS: 0

## 2023-11-17 NOTE — PROGRESS NOTES
GENERAL SURGERY CLINIC NOTE    Heena Varner   1995   [id number]     History Of Present Illness  Heena Varner is a 28 y.o. female who presents to the office for follow up of rectal bleeding and hemorrhoids. She has had hemorrhoids since 8310-0055 and underwent colonoscopy at that time in [place] for evaluation of rectal bleeding. The only abnormalities she recall being noted were hemorrhoids. She has a history of IBS and alternates between very watery diarrhea and significant constipation, which has since improved. She also experiences occasional crampy abdominal pain. She typically has a small amount of blood with bowel movements, but earlier this year, the volume has increased significantly. She was previously evaluated 6/28/23 and the right posterior internal hemorrhoids were banded. The bleeding stopped for 3 weeks, and then resumed. She slept through her follow up appointment and then did not follow up due to her stepfather passing away. In the last 1-2 months, she has had significantly increased volume of bleeding even without having bowel movements.      Past Medical History  She has a past medical history of Acute pharyngitis (03/07/2023), Personal history of other diseases of the circulatory system, Personal history of other diseases of the digestive system, Personal history of other diseases of the respiratory system, and Personal history of other drug therapy.    Surgical History  She has a past surgical history that includes Other surgical history (09/07/2017) and Colonoscopy (09/12/2017).    Medications  Current Outpatient Medications on File Prior to Visit   Medication Sig Dispense Refill    albuterol 2.5 mg /3 mL (0.083 %) nebulizer solution Take 3 mL (2.5 mg) by nebulization every 4 hours if needed (Asthma).      amphetamine-dextroamphetamine (AdderalL) 30 mg tablet Take 0.5 tablets (15 mg) by mouth 2 times daily after breakfast and lunch. 30 tablet 0    Aviane 0.1-20 mg-mcg tablet Take by  mouth.      citalopram (CeleXA) 20 mg tablet Take 1 tablet (20 mg) by mouth once daily. 90 tablet 3    Clindagel 1 % gel, once daily Apply topically 2 times a day.      Drysol Dab-O-Matic 20 % external solution Apply topically once daily. apply to axilla      sulfacetamide sodium-sulfur (Avar, Plexion) 10-5 % (w/w) topical emulsion wash face twice daily as directed      tazarotene (Tazorac) 0.1 % gel apply to face every night sparingly      traZODone (Desyrel) 50 mg tablet Take 1 tablet (50 mg) by mouth once daily at bedtime. 90 tablet 1    [START ON 11/19/2023] amphetamine-dextroamphetamine (Adderall) 15 mg tablet Take 1 tablet (15 mg) by mouth 2 times daily after breakfast and lunch. Do not start before November 19, 2023. (Patient not taking: Reported on 11/17/2023 Do not start before November 19, 2023.) 60 tablet 0    loratadine-pseudoephedrine (Loratadine-D)  mg 24 hr tablet Take 1 tablet by mouth once daily.       No current facility-administered medications on file prior to visit.       Allergies  Bee venom protein (honey bee)     Social History  She reports that she has never smoked. She has never used smokeless tobacco. She reports that she does not currently use alcohol. She reports that she does not use drugs.    Family History  Family History   Problem Relation Name Age of Onset    Arthritis Mother Alidaelissa Villegas     Depression Mother Asagaryelissa Villegas     Alcohol abuse Father Gene Ramirez     Depression Father Gene Canisteo     Depression Brother Mitchell albright     Heart disease Mother's Brother Aldo Varner         Review of Systems   Constitutional:  Negative for chills and fever.   Respiratory:  Negative for cough and shortness of breath.    Cardiovascular:  Negative for chest pain and palpitations.   Gastrointestinal:  Positive for anal bleeding. Negative for abdominal pain, blood in stool, constipation, diarrhea, nausea, rectal pain and vomiting.   Neurological:  Negative for dizziness and  "headaches.   All other systems reviewed and are negative.      Last Recorded Vitals  Blood pressure 129/84, pulse 101, height 1.626 m (5' 4\"), weight 54.3 kg (119 lb 12.8 oz), SpO2 99 %.     Physical Exam  Constitutional:       General: She is not in acute distress.     Appearance: Normal appearance. She is not ill-appearing.   HENT:      Head: Normocephalic and atraumatic.   Cardiovascular:      Rate and Rhythm: Normal rate and regular rhythm.   Pulmonary:      Effort: Pulmonary effort is normal. No respiratory distress.      Breath sounds: Normal breath sounds.   Abdominal:      General: There is no distension.      Palpations: Abdomen is soft.      Tenderness: There is no abdominal tenderness. There is no guarding.   Genitourinary:     Comments: The examination was performed in the presence of a chaperone.    External rectal exam: very small, nonthrombosed right posterior external hemorrhoid, small, nonthrombosed right anterior external hemorrhoid.    MINDY: normal rectal tone, normal mucosa. No gross blood    Anoscopy: mild to moderate internal hemorrhoids in the right posterior group. Larger internal hemorrhoids in the right anterior and left lateral groups  Musculoskeletal:         General: No swelling.   Skin:     General: Skin is warm and dry.   Neurological:      Mental Status: She is alert and oriented to person, place, and time. Mental status is at baseline.   Psychiatric:         Mood and Affect: Mood normal.         Behavior: Behavior normal.       Assessment and Plan  28 y.o. female with bleeding internal hemorrhoids, likely from the right anterior group from her self examinations. I discussed undergoing repeat band ligation today vs operative intervention. Given the persistent nature of these hemorrhoids, the patient wanted to consider hemorrhoidectomy over banding. I discussed that I plan to excise the left lateral and right anterior internal hemorrhoids, and likely leave the external hemorrhoids in " place as they are asymptomatic. She was agreeable to the tentative plan. The risks and benefits of the procedure were discussed. Risks include COVID-19 transmission/infection, allergic reactions, heart or lung complications, bleeding, infection, injury to surrounding tissues (sphincters), additional planned or unplanned procedures required, nonresolution of symptoms, pain and opioid addiction. The patient expressed understanding and provided informed consent for surgery.     OR 12/12/23 for EUA, hemorrhoidectomy. Phone screening.    Sophie Calix MD, [facility]  General Surgery

## 2023-11-17 NOTE — H&P (VIEW-ONLY)
GENERAL SURGERY CLINIC NOTE    Heena Varner   1995   [id number]     History Of Present Illness  Heena Varner is a 28 y.o. female who presents to the office for follow up of rectal bleeding and hemorrhoids. She has had hemorrhoids since 3034-2072 and underwent colonoscopy at that time in [place] for evaluation of rectal bleeding. The only abnormalities she recall being noted were hemorrhoids. She has a history of IBS and alternates between very watery diarrhea and significant constipation, which has since improved. She also experiences occasional crampy abdominal pain. She typically has a small amount of blood with bowel movements, but earlier this year, the volume has increased significantly. She was previously evaluated 6/28/23 and the right posterior internal hemorrhoids were banded. The bleeding stopped for 3 weeks, and then resumed. She slept through her follow up appointment and then did not follow up due to her stepfather passing away. In the last 1-2 months, she has had significantly increased volume of bleeding even without having bowel movements.      Past Medical History  She has a past medical history of Acute pharyngitis (03/07/2023), Personal history of other diseases of the circulatory system, Personal history of other diseases of the digestive system, Personal history of other diseases of the respiratory system, and Personal history of other drug therapy.    Surgical History  She has a past surgical history that includes Other surgical history (09/07/2017) and Colonoscopy (09/12/2017).    Medications  Current Outpatient Medications on File Prior to Visit   Medication Sig Dispense Refill    albuterol 2.5 mg /3 mL (0.083 %) nebulizer solution Take 3 mL (2.5 mg) by nebulization every 4 hours if needed (Asthma).      amphetamine-dextroamphetamine (AdderalL) 30 mg tablet Take 0.5 tablets (15 mg) by mouth 2 times daily after breakfast and lunch. 30 tablet 0    Aviane 0.1-20 mg-mcg tablet Take by  mouth.      citalopram (CeleXA) 20 mg tablet Take 1 tablet (20 mg) by mouth once daily. 90 tablet 3    Clindagel 1 % gel, once daily Apply topically 2 times a day.      Drysol Dab-O-Matic 20 % external solution Apply topically once daily. apply to axilla      sulfacetamide sodium-sulfur (Avar, Plexion) 10-5 % (w/w) topical emulsion wash face twice daily as directed      tazarotene (Tazorac) 0.1 % gel apply to face every night sparingly      traZODone (Desyrel) 50 mg tablet Take 1 tablet (50 mg) by mouth once daily at bedtime. 90 tablet 1    [START ON 11/19/2023] amphetamine-dextroamphetamine (Adderall) 15 mg tablet Take 1 tablet (15 mg) by mouth 2 times daily after breakfast and lunch. Do not start before November 19, 2023. (Patient not taking: Reported on 11/17/2023 Do not start before November 19, 2023.) 60 tablet 0    loratadine-pseudoephedrine (Loratadine-D)  mg 24 hr tablet Take 1 tablet by mouth once daily.       No current facility-administered medications on file prior to visit.       Allergies  Bee venom protein (honey bee)     Social History  She reports that she has never smoked. She has never used smokeless tobacco. She reports that she does not currently use alcohol. She reports that she does not use drugs.    Family History  Family History   Problem Relation Name Age of Onset    Arthritis Mother Alidaelissa Villegas     Depression Mother Asagaryelissa Villegas     Alcohol abuse Father Gene Ramirez     Depression Father Gene Hurlock     Depression Brother Mitchell albright     Heart disease Mother's Brother Aldo Varner         Review of Systems   Constitutional:  Negative for chills and fever.   Respiratory:  Negative for cough and shortness of breath.    Cardiovascular:  Negative for chest pain and palpitations.   Gastrointestinal:  Positive for anal bleeding. Negative for abdominal pain, blood in stool, constipation, diarrhea, nausea, rectal pain and vomiting.   Neurological:  Negative for dizziness and  "headaches.   All other systems reviewed and are negative.      Last Recorded Vitals  Blood pressure 129/84, pulse 101, height 1.626 m (5' 4\"), weight 54.3 kg (119 lb 12.8 oz), SpO2 99 %.     Physical Exam  Constitutional:       General: She is not in acute distress.     Appearance: Normal appearance. She is not ill-appearing.   HENT:      Head: Normocephalic and atraumatic.   Cardiovascular:      Rate and Rhythm: Normal rate and regular rhythm.   Pulmonary:      Effort: Pulmonary effort is normal. No respiratory distress.      Breath sounds: Normal breath sounds.   Abdominal:      General: There is no distension.      Palpations: Abdomen is soft.      Tenderness: There is no abdominal tenderness. There is no guarding.   Genitourinary:     Comments: The examination was performed in the presence of a chaperone.    External rectal exam: very small, nonthrombosed right posterior external hemorrhoid, small, nonthrombosed right anterior external hemorrhoid.    MINDY: normal rectal tone, normal mucosa. No gross blood    Anoscopy: mild to moderate internal hemorrhoids in the right posterior group. Larger internal hemorrhoids in the right anterior and left lateral groups  Musculoskeletal:         General: No swelling.   Skin:     General: Skin is warm and dry.   Neurological:      Mental Status: She is alert and oriented to person, place, and time. Mental status is at baseline.   Psychiatric:         Mood and Affect: Mood normal.         Behavior: Behavior normal.       Assessment and Plan  28 y.o. female with bleeding internal hemorrhoids, likely from the right anterior group from her self examinations. I discussed undergoing repeat band ligation today vs operative intervention. Given the persistent nature of these hemorrhoids, the patient wanted to consider hemorrhoidectomy over banding. I discussed that I plan to excise the left lateral and right anterior internal hemorrhoids, and likely leave the external hemorrhoids in " place as they are asymptomatic. She was agreeable to the tentative plan. The risks and benefits of the procedure were discussed. Risks include COVID-19 transmission/infection, allergic reactions, heart or lung complications, bleeding, infection, injury to surrounding tissues (sphincters), additional planned or unplanned procedures required, nonresolution of symptoms, pain and opioid addiction. The patient expressed understanding and provided informed consent for surgery.     OR 12/12/23 for EUA, hemorrhoidectomy. Phone screening.    Sophie Calix MD, [facility]  General Surgery

## 2023-11-29 ENCOUNTER — ANESTHESIA EVENT (OUTPATIENT)
Dept: OPERATING ROOM | Facility: HOSPITAL | Age: 28
End: 2023-11-29
Payer: COMMERCIAL

## 2023-12-12 ENCOUNTER — PHARMACY VISIT (OUTPATIENT)
Dept: PHARMACY | Facility: CLINIC | Age: 28
End: 2023-12-12
Payer: MEDICAID

## 2023-12-12 ENCOUNTER — APPOINTMENT (OUTPATIENT)
Dept: CARDIOLOGY | Facility: HOSPITAL | Age: 28
End: 2023-12-12
Payer: COMMERCIAL

## 2023-12-12 ENCOUNTER — ANESTHESIA (OUTPATIENT)
Dept: OPERATING ROOM | Facility: HOSPITAL | Age: 28
End: 2023-12-12
Payer: COMMERCIAL

## 2023-12-12 ENCOUNTER — HOSPITAL ENCOUNTER (OUTPATIENT)
Facility: HOSPITAL | Age: 28
Setting detail: OUTPATIENT SURGERY
Discharge: HOME | End: 2023-12-12
Attending: SURGERY | Admitting: SURGERY
Payer: COMMERCIAL

## 2023-12-12 VITALS
OXYGEN SATURATION: 100 % | SYSTOLIC BLOOD PRESSURE: 119 MMHG | HEART RATE: 67 BPM | WEIGHT: 119 LBS | HEIGHT: 64 IN | RESPIRATION RATE: 16 BRPM | TEMPERATURE: 97.6 F | DIASTOLIC BLOOD PRESSURE: 80 MMHG | BODY MASS INDEX: 20.32 KG/M2

## 2023-12-12 DIAGNOSIS — K62.5 RECTAL BLEEDING: ICD-10-CM

## 2023-12-12 DIAGNOSIS — K64.8 INTERNAL AND EXTERNAL BLEEDING HEMORRHOIDS: Primary | ICD-10-CM

## 2023-12-12 DIAGNOSIS — K64.4 INTERNAL AND EXTERNAL BLEEDING HEMORRHOIDS: Primary | ICD-10-CM

## 2023-12-12 LAB
ATRIAL RATE: 64 BPM
ERYTHROCYTE [DISTWIDTH] IN BLOOD BY AUTOMATED COUNT: 13.9 % (ref 11.5–14.5)
HCT VFR BLD AUTO: 33 % (ref 36–46)
HGB BLD-MCNC: 10.7 G/DL (ref 12–16)
MCH RBC QN AUTO: 28.2 PG (ref 26–34)
MCHC RBC AUTO-ENTMCNC: 32.4 G/DL (ref 32–36)
MCV RBC AUTO: 87 FL (ref 80–100)
NRBC BLD-RTO: 0 /100 WBCS (ref 0–0)
P AXIS: 69 DEGREES
PLATELET # BLD AUTO: 243 X10*3/UL (ref 150–450)
PR INTERVAL: 135 MS
PREGNANCY TEST URINE, POC: NEGATIVE
Q ONSET: 249 MS
QRS COUNT: 11 BEATS
QRS DURATION: 77 MS
QT INTERVAL: 391 MS
QTC CALCULATION(BAZETT): 407 MS
QTC FREDERICIA: 401 MS
R AXIS: 68 DEGREES
RBC # BLD AUTO: 3.8 X10*6/UL (ref 4–5.2)
T AXIS: 38 DEGREES
T OFFSET: 445 MS
VENTRICULAR RATE: 65 BPM
WBC # BLD AUTO: 6.8 X10*3/UL (ref 4.4–11.3)

## 2023-12-12 PROCEDURE — 93010 ELECTROCARDIOGRAM REPORT: CPT | Performed by: INTERNAL MEDICINE

## 2023-12-12 PROCEDURE — 7100000002 HC RECOVERY ROOM TIME - EACH INCREMENTAL 1 MINUTE: Performed by: SURGERY

## 2023-12-12 PROCEDURE — RXMED WILLOW AMBULATORY MEDICATION CHARGE

## 2023-12-12 PROCEDURE — 46260 REMOVE IN/EX HEM GROUPS 2+: CPT | Performed by: SURGERY

## 2023-12-12 PROCEDURE — 2500000004 HC RX 250 GENERAL PHARMACY W/ HCPCS (ALT 636 FOR OP/ED): Performed by: ANESTHESIOLOGY

## 2023-12-12 PROCEDURE — 7100000001 HC RECOVERY ROOM TIME - INITIAL BASE CHARGE: Performed by: SURGERY

## 2023-12-12 PROCEDURE — 2720000007 HC OR 272 NO HCPCS: Performed by: SURGERY

## 2023-12-12 PROCEDURE — 7100000009 HC PHASE TWO TIME - INITIAL BASE CHARGE: Performed by: SURGERY

## 2023-12-12 PROCEDURE — 0752T DGTZ GLS MCRSCP SLD LVL III: CPT | Mod: TC,SUR,PORLAB,MUE | Performed by: SURGERY

## 2023-12-12 PROCEDURE — 7100000010 HC PHASE TWO TIME - EACH INCREMENTAL 1 MINUTE: Performed by: SURGERY

## 2023-12-12 PROCEDURE — 3700000001 HC GENERAL ANESTHESIA TIME - INITIAL BASE CHARGE: Performed by: SURGERY

## 2023-12-12 PROCEDURE — 2500000001 HC RX 250 WO HCPCS SELF ADMINISTERED DRUGS (ALT 637 FOR MEDICARE OP): Performed by: SURGERY

## 2023-12-12 PROCEDURE — 93005 ELECTROCARDIOGRAM TRACING: CPT

## 2023-12-12 PROCEDURE — 2500000005 HC RX 250 GENERAL PHARMACY W/O HCPCS: Performed by: SURGERY

## 2023-12-12 PROCEDURE — 85027 COMPLETE CBC AUTOMATED: CPT | Performed by: ANESTHESIOLOGY

## 2023-12-12 PROCEDURE — 3600000007 HC OR TIME - EACH INCREMENTAL 1 MINUTE - PROCEDURE LEVEL TWO: Performed by: SURGERY

## 2023-12-12 PROCEDURE — 88304 TISSUE EXAM BY PATHOLOGIST: CPT | Performed by: PATHOLOGY

## 2023-12-12 PROCEDURE — 3600000002 HC OR TIME - INITIAL BASE CHARGE - PROCEDURE LEVEL TWO: Performed by: SURGERY

## 2023-12-12 PROCEDURE — 2780000003 HC OR 278 NO HCPCS: Performed by: SURGERY

## 2023-12-12 PROCEDURE — 3700000002 HC GENERAL ANESTHESIA TIME - EACH INCREMENTAL 1 MINUTE: Performed by: SURGERY

## 2023-12-12 PROCEDURE — 2500000004 HC RX 250 GENERAL PHARMACY W/ HCPCS (ALT 636 FOR OP/ED): Performed by: NURSE ANESTHETIST, CERTIFIED REGISTERED

## 2023-12-12 PROCEDURE — 36415 COLL VENOUS BLD VENIPUNCTURE: CPT | Performed by: ANESTHESIOLOGY

## 2023-12-12 RX ORDER — LIDOCAINE HYDROCHLORIDE 20 MG/ML
JELLY TOPICAL AS NEEDED
Status: DISCONTINUED | OUTPATIENT
Start: 2023-12-12 | End: 2023-12-12

## 2023-12-12 RX ORDER — OXYCODONE AND ACETAMINOPHEN 5; 325 MG/1; MG/1
1 TABLET ORAL EVERY 6 HOURS PRN
Qty: 15 TABLET | Refills: 0 | Status: SHIPPED | OUTPATIENT
Start: 2023-12-12 | End: 2024-01-26 | Stop reason: ALTCHOICE

## 2023-12-12 RX ORDER — ONDANSETRON HYDROCHLORIDE 2 MG/ML
4 INJECTION, SOLUTION INTRAVENOUS ONCE AS NEEDED
Status: DISCONTINUED | OUTPATIENT
Start: 2023-12-12 | End: 2023-12-12 | Stop reason: HOSPADM

## 2023-12-12 RX ORDER — PROPOFOL 10 MG/ML
INJECTION, EMULSION INTRAVENOUS AS NEEDED
Status: DISCONTINUED | OUTPATIENT
Start: 2023-12-12 | End: 2023-12-12

## 2023-12-12 RX ORDER — ONDANSETRON HYDROCHLORIDE 2 MG/ML
4 INJECTION, SOLUTION INTRAVENOUS ONCE
Status: COMPLETED | OUTPATIENT
Start: 2023-12-12 | End: 2023-12-12

## 2023-12-12 RX ORDER — FAMOTIDINE 10 MG/ML
20 INJECTION INTRAVENOUS ONCE
Status: COMPLETED | OUTPATIENT
Start: 2023-12-12 | End: 2023-12-12

## 2023-12-12 RX ORDER — KETOROLAC TROMETHAMINE 30 MG/ML
INJECTION, SOLUTION INTRAMUSCULAR; INTRAVENOUS AS NEEDED
Status: DISCONTINUED | OUTPATIENT
Start: 2023-12-12 | End: 2023-12-12

## 2023-12-12 RX ORDER — DEXAMETHASONE SODIUM PHOSPHATE 100 MG/10ML
INJECTION INTRAMUSCULAR; INTRAVENOUS AS NEEDED
Status: DISCONTINUED | OUTPATIENT
Start: 2023-12-12 | End: 2023-12-12

## 2023-12-12 RX ORDER — BUPIVACAINE HYDROCHLORIDE 2.5 MG/ML
INJECTION, SOLUTION INFILTRATION; PERINEURAL AS NEEDED
Status: DISCONTINUED | OUTPATIENT
Start: 2023-12-12 | End: 2023-12-12 | Stop reason: HOSPADM

## 2023-12-12 RX ORDER — GLYCOPYRROLATE 0.2 MG/ML
INJECTION INTRAMUSCULAR; INTRAVENOUS AS NEEDED
Status: DISCONTINUED | OUTPATIENT
Start: 2023-12-12 | End: 2023-12-12

## 2023-12-12 RX ORDER — FENTANYL CITRATE 50 UG/ML
INJECTION, SOLUTION INTRAMUSCULAR; INTRAVENOUS AS NEEDED
Status: DISCONTINUED | OUTPATIENT
Start: 2023-12-12 | End: 2023-12-12

## 2023-12-12 RX ORDER — LIDOCAINE HYDROCHLORIDE 20 MG/ML
JELLY TOPICAL AS NEEDED
Status: DISCONTINUED | OUTPATIENT
Start: 2023-12-12 | End: 2023-12-12 | Stop reason: HOSPADM

## 2023-12-12 RX ORDER — PHENYLEPHRINE HCL IN 0.9% NACL 1 MG/10 ML
SYRINGE (ML) INTRAVENOUS AS NEEDED
Status: DISCONTINUED | OUTPATIENT
Start: 2023-12-12 | End: 2023-12-12

## 2023-12-12 RX ORDER — SODIUM CHLORIDE 9 MG/ML
100 INJECTION, SOLUTION INTRAVENOUS CONTINUOUS
Status: DISCONTINUED | OUTPATIENT
Start: 2023-12-12 | End: 2023-12-12 | Stop reason: HOSPADM

## 2023-12-12 RX ORDER — MIDAZOLAM HYDROCHLORIDE 1 MG/ML
INJECTION, SOLUTION INTRAMUSCULAR; INTRAVENOUS AS NEEDED
Status: DISCONTINUED | OUTPATIENT
Start: 2023-12-12 | End: 2023-12-12

## 2023-12-12 RX ORDER — SODIUM CHLORIDE, SODIUM LACTATE, POTASSIUM CHLORIDE, CALCIUM CHLORIDE 600; 310; 30; 20 MG/100ML; MG/100ML; MG/100ML; MG/100ML
50 INJECTION, SOLUTION INTRAVENOUS CONTINUOUS
Status: DISCONTINUED | OUTPATIENT
Start: 2023-12-12 | End: 2023-12-12 | Stop reason: HOSPADM

## 2023-12-12 RX ADMIN — Medication 50 MCG: at 08:48

## 2023-12-12 RX ADMIN — ONDANSETRON 4 MG: 2 INJECTION INTRAMUSCULAR; INTRAVENOUS at 08:18

## 2023-12-12 RX ADMIN — FAMOTIDINE 20 MG: 10 INJECTION, SOLUTION INTRAVENOUS at 08:18

## 2023-12-12 RX ADMIN — LIDOCAINE HYDROCHLORIDE 60 APPLICATION: 20 JELLY TOPICAL at 08:36

## 2023-12-12 RX ADMIN — Medication 50 MCG: at 08:44

## 2023-12-12 RX ADMIN — KETOROLAC TROMETHAMINE 30 MG: 30 INJECTION, SOLUTION INTRAMUSCULAR; INTRAVENOUS at 09:05

## 2023-12-12 RX ADMIN — GLYCOPYRROLATE 0.2 MG: 0.2 INJECTION, SOLUTION INTRAMUSCULAR; INTRAVENOUS at 08:59

## 2023-12-12 RX ADMIN — MIDAZOLAM HYDROCHLORIDE 2 MG: 1 INJECTION, SOLUTION INTRAMUSCULAR; INTRAVENOUS at 08:27

## 2023-12-12 RX ADMIN — DEXAMETHASONE SODIUM PHOSPHATE 8 MG: 10 INJECTION INTRAMUSCULAR; INTRAVENOUS at 08:42

## 2023-12-12 RX ADMIN — SODIUM CHLORIDE, POTASSIUM CHLORIDE, SODIUM LACTATE AND CALCIUM CHLORIDE 50 ML/HR: 600; 310; 30; 20 INJECTION, SOLUTION INTRAVENOUS at 08:18

## 2023-12-12 RX ADMIN — FENTANYL CITRATE 50 MCG: 50 INJECTION, SOLUTION INTRAMUSCULAR; INTRAVENOUS at 08:36

## 2023-12-12 RX ADMIN — PROPOFOL 150 MG: 10 INJECTION, EMULSION INTRAVENOUS at 08:36

## 2023-12-12 SDOH — HEALTH STABILITY: MENTAL HEALTH: CURRENT SMOKER: 0

## 2023-12-12 ASSESSMENT — PAIN SCALES - GENERAL
PAIN_LEVEL: 0
PAINLEVEL_OUTOF10: 0 - NO PAIN

## 2023-12-12 ASSESSMENT — COLUMBIA-SUICIDE SEVERITY RATING SCALE - C-SSRS
6. HAVE YOU EVER DONE ANYTHING, STARTED TO DO ANYTHING, OR PREPARED TO DO ANYTHING TO END YOUR LIFE?: NO
2. HAVE YOU ACTUALLY HAD ANY THOUGHTS OF KILLING YOURSELF?: NO

## 2023-12-12 ASSESSMENT — PAIN - FUNCTIONAL ASSESSMENT
PAIN_FUNCTIONAL_ASSESSMENT: 0-10
PAIN_FUNCTIONAL_ASSESSMENT: 0-10

## 2023-12-12 NOTE — ANESTHESIA POSTPROCEDURE EVALUATION
Patient: Heena Varner    Procedure Summary       Date: 12/12/23 Room / Location: [facility] OR 01 / Virtual [facility] OR    Anesthesia Start: 0832 Anesthesia Stop: 0913    Procedure: rectal examination under anesthesia, hemorrhoidectomy Diagnosis:       Rectal bleeding      Internal and external bleeding hemorrhoids      (Rectal bleeding [K62.5])      (Internal and external bleeding hemorrhoids [K64.4, K64.8])    Surgeons: Sophie Calix MD Responsible Provider: SEJAL Gonzalez    Anesthesia Type: general ASA Status: 2            Anesthesia Type: general    Vitals Value Taken Time   /80 12/12/23 0950   Temp 36.2 °C (97.2 °F) 12/12/23 0909   Pulse 70 12/12/23 0949   Resp 15 12/12/23 0949   SpO2 100 % 12/12/23 0949   Vitals shown include unvalidated device data.    Anesthesia Post Evaluation    Patient location during evaluation: PACU  Patient participation: complete - patient participated  Level of consciousness: awake and alert  Pain score: 0  Pain management: adequate  Airway patency: patent  Cardiovascular status: acceptable  Respiratory status: acceptable  Hydration status: acceptable  Postoperative Nausea and Vomiting: none    No notable events documented.

## 2023-12-12 NOTE — ANESTHESIA PREPROCEDURE EVALUATION
Patient: Heena Varner    Procedure Information       Date/Time: 12/12/23 0900    Procedure: rectal examination under anesthesia, hemorrhoidectomy - 45 minutes    Location: [facility] OR 01 / Virtual [facility] OR    Surgeons: Sophie Calix MD            Relevant Problems   Cardiovascular   (+) Internal and external bleeding hemorrhoids      GI   (+) Internal and external bleeding hemorrhoids   (+) Rectal bleeding      Neuro/Psych   (+) Anxiety state   (+) Depression       Clinical information reviewed:   Tobacco  Allergies  Meds  Problems  Med Hx  Surg Hx   Fam Hx  Soc   Hx        NPO Detail:  NPO/Void Status  Date of Last Liquid: 12/11/23  Time of Last Liquid: 2300  Date of Last Solid: 12/11/23  Time of Last Solid: 2300         Physical Exam    Airway  Mallampati: II  TM distance: >3 FB  Neck ROM: limited     Cardiovascular - normal exam     Dental - normal exam     Pulmonary - normal exam     Abdominal            Anesthesia Plan    ASA 2     general     The patient is not a current smoker.    intravenous induction   Anesthetic plan and risks discussed with patient.  Use of blood products discussed with patient who.

## 2023-12-12 NOTE — DISCHARGE INSTRUCTIONS
Discharge Instructions    Incisions  You may take a shower or bath starting today.   Keep the incisions clean and dry.   Wear a menstrual pad as needed to keep drainage from soiling clothing.    Pain  Do not drive while taking stronger pain medications (Tramadol, Percocet, Norco, Oxycodone, etc).   You may drive once you feel comfortable without stronger pain medications and can drive without any significant distractions related to your pain or surgical sites.  You may alternate acetaminophen and ibuprofen for pain control, as long as you are able to take either medication.     Diet  Resume your normal diet. Your appetite may not fully return immediately.   Please drink plenty of fluids to maintain hydration.    Follow Up  Follow up with Dr. Sophie Calix in the office per the instructions above. Please call the office to follow up sooner if there are concerns you would like to discuss.    Call Provider  If you are experiencing fever (100.4F or higher).  If you are experiencing significantly worsening pain, nausea or vomiting.  If you have very loose or watery bowel movements.  If you have any new concerning symptoms.

## 2023-12-12 NOTE — OP NOTE
Rectal examination under anesthesia, hemorrhoidectomy Operative Note     Date: 2023  OR Location: [facility] OR    Name: Heena Varner, : 1995, Age: 28 y.o., MRN: [id number], Sex: female    Diagnosis  Pre-op Diagnosis     * Rectal bleeding [K62.5]     * Internal and external bleeding hemorrhoids [K64.4, K64.8] Post-op Diagnosis     * Rectal bleeding [K62.5]     * Internal and external bleeding hemorrhoids [K64.4, K64.8]     Procedures  rectal examination under anesthesia, hemorrhoidectomy  13799 - CA HEMORRHOIDECTOMY INT & XTRNL 2/> COLUMN/GUIDO      Surgeons      * Sophie Calix - Primary    Resident/Fellow/Other Assistant:  Surgeon(s) and Role: USAMA Becerra    Procedure Summary  Anesthesia: Monitor Anesthesia Care  ASA: II  Anesthesia Staff: CRNA: KENNY Gonzalez-CRNA  Estimated Blood Loss: 5mL  Intra-op Medications:   Medication Name Total Dose   lidocaine (Uro-Jet) 2 % gel 1 Application   BUPivacaine HCl (Marcaine) 0.25 % (2.5 mg/mL) injection 20 mL   lactated Ringer's infusion 960 mL     Specimen:   ID Type Source Tests Collected by Time   1 : LEFT LATERAL HEMORIDIAL GROUP Tissue HEMORRHOID SURGICAL PATHOLOGY EXAM Sophie Calix MD 2023 0852   2 : RIGHT ANTERIOR HEMORIDIAL GROUP Tissue HEMORRHOID SURGICAL PATHOLOGY EXAM Sophie Calix MD 2023 0855        Staff:   Circulator: Ani Spann RN  Scrub Person: Rhonda Parisi RN    Findings: small external hemorrhoids, larger internal hemorrhoids in all groups    Indications: Heena Varner is an 28 y.o. female who is having surgery for Rectal bleeding [K62.5]  Internal and external bleeding hemorrhoids [K64.4, K64.8].     The patient was seen in the preoperative area. The risks, benefits, complications, treatment options, non-operative alternatives, expected recovery and outcomes were discussed with the patient. The possibilities of reaction to medication, pulmonary aspiration, injury to surrounding structures,  bleeding, recurrent infection, the need for additional procedures, failure to diagnose a condition, and creating a complication requiring transfusion or operation were discussed with the patient. The patient concurred with the proposed plan, giving informed consent.  The site of surgery was properly noted/marked if necessary per policy. The patient has been actively warmed in preoperative area. Preoperative antibiotics are not indicated. Venous thrombosis prophylaxis have been ordered including bilateral sequential compression devices    Procedure Details:   The patient was taken to the operating room and underwent general anesthesia care. Sequential compression devices were placed. The patient was placed in lithotomy position and all pressure points were padded. The perineal region was prepped and draped in the usual sterile fashion using betadine. A time-out was performed with all parties present.    Local anesthesia was given using 0.25% Marcaine. An anoscopy was performed using the half-moon retractor. The patient had small external hemorrhoids in all positions and larger internal hemorrhoids in all positions. The mucosa otherwise appeared smooth and without any masses. We chose to excise the largest/most irritated-appearing two internal hemorrhoids, which were the right anterior and leftl lateral groups. Hemorrhoidectomy was performed using a combination of electrocautery and the Ligasure device. Hemostasis was achieved. A piece of surgifoam was coated in lidocaine gel and placed into the anal canal for packing.     The patient was awoken and taken to recovery in stable condition. All needle, sponge and instrument counts were correct at the end of the case. I was present for the entire case.    Complications:  None; patient tolerated the procedure well.    Disposition: PACU - hemodynamically stable.  Condition: stable     Attending Attestation: I was present and scrubbed for the entire procedure.    Sophie KHAN  Yogi  Phone Number: [phone]

## 2023-12-13 ENCOUNTER — OFFICE VISIT (OUTPATIENT)
Dept: PRIMARY CARE | Facility: CLINIC | Age: 28
End: 2023-12-13
Payer: COMMERCIAL

## 2023-12-13 VITALS
BODY MASS INDEX: 21.8 KG/M2 | OXYGEN SATURATION: 99 % | DIASTOLIC BLOOD PRESSURE: 74 MMHG | WEIGHT: 127 LBS | HEART RATE: 82 BPM | TEMPERATURE: 97 F | SYSTOLIC BLOOD PRESSURE: 116 MMHG

## 2023-12-13 DIAGNOSIS — F90.0 ATTENTION DEFICIT HYPERACTIVITY DISORDER, PREDOMINANTLY INATTENTIVE TYPE: ICD-10-CM

## 2023-12-13 PROCEDURE — 1036F TOBACCO NON-USER: CPT | Performed by: FAMILY MEDICINE

## 2023-12-13 PROCEDURE — 99213 OFFICE O/P EST LOW 20 MIN: CPT | Performed by: FAMILY MEDICINE

## 2023-12-13 RX ORDER — DEXTROAMPHETAMINE SACCHARATE, AMPHETAMINE ASPARTATE, DEXTROAMPHETAMINE SULFATE AND AMPHETAMINE SULFATE 3.75; 3.75; 3.75; 3.75 MG/1; MG/1; MG/1; MG/1
15 TABLET ORAL
Qty: 60 TABLET | Refills: 0 | Status: SHIPPED | OUTPATIENT
Start: 2024-01-12 | End: 2024-02-11

## 2023-12-13 RX ORDER — DEXTROAMPHETAMINE SACCHARATE, AMPHETAMINE ASPARTATE, DEXTROAMPHETAMINE SULFATE AND AMPHETAMINE SULFATE 3.75; 3.75; 3.75; 3.75 MG/1; MG/1; MG/1; MG/1
15 TABLET ORAL
Qty: 60 TABLET | Refills: 0 | Status: SHIPPED | OUTPATIENT
Start: 2024-02-11 | End: 2024-03-19 | Stop reason: SDUPTHER

## 2023-12-13 RX ORDER — DEXTROAMPHETAMINE SACCHARATE, AMPHETAMINE ASPARTATE, DEXTROAMPHETAMINE SULFATE AND AMPHETAMINE SULFATE 3.75; 3.75; 3.75; 3.75 MG/1; MG/1; MG/1; MG/1
15 TABLET ORAL
Qty: 60 TABLET | Refills: 0 | Status: SHIPPED | OUTPATIENT
Start: 2023-12-13 | End: 2024-01-12

## 2023-12-13 NOTE — PROGRESS NOTES
Subjective   Patient ID: Heena Varner is a 28 y.o. female who presents for Anxiety and Depression (Recheck.).    ADD: doing well.  Refilled meds.  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    Hemorrhoid: no status post surgery.  Doing well.    Current Outpatient Medications on File Prior to Visit   Medication Sig Dispense Refill    albuterol 2.5 mg /3 mL (0.083 %) nebulizer solution Take 3 mL (2.5 mg) by nebulization every 4 hours if needed (Asthma).      Aviane 0.1-20 mg-mcg tablet Take by mouth.      citalopram (CeleXA) 20 mg tablet Take 1 tablet (20 mg) by mouth once daily. 90 tablet 3    Clindagel 1 % gel, once daily Apply topically 2 times a day.      Drysol Dab-O-Matic 20 % external solution Apply topically once daily. apply to axilla      loratadine-pseudoephedrine (Loratadine-D)  mg 24 hr tablet Take 1 tablet by mouth once daily.      oxyCODONE-acetaminophen (Percocet) 5-325 mg tablet Take 1 tablet by mouth every 6 hours if needed for severe pain (7 - 10). 15 tablet 0    sulfacetamide sodium-sulfur (Avar, Plexion) 10-5 % (w/w) topical emulsion wash face twice daily as directed      tazarotene (Tazorac) 0.1 % gel apply to face every night sparingly      traZODone (Desyrel) 50 mg tablet Take 1 tablet (50 mg) by mouth once daily at bedtime. 90 tablet 1    [DISCONTINUED] amphetamine-dextroamphetamine (Adderall) 15 mg tablet Take 1 tablet (15 mg) by mouth 2 times daily after breakfast and lunch. Do not start before November 19, 2023. 60 tablet 0    [DISCONTINUED] amphetamine-dextroamphetamine (AdderalL) 30 mg tablet Take 0.5 tablets (15 mg) by mouth 2 times daily after breakfast and lunch. 30 tablet 0     Current Facility-Administered Medications on File Prior to Visit   Medication Dose Route Frequency Provider Last Rate Last Admin    [DISCONTINUED] HYDROmorphone (Dilaudid) injection 0.5 mg  0.5 mg intravenous q5 min PRN Luis Lofton, DO        [DISCONTINUED] HYDROmorphone PF  (Dilaudid) injection 0.2 mg  0.2 mg intravenous q5 min PRN Luis Lofton DO        [DISCONTINUED] lactated Ringer's infusion  50 mL/hr intravenous Continuous Luis Lofton  mL/hr at 12/12/23 0903 Rate Change at 12/12/23 0903    [DISCONTINUED] ondansetron (Zofran) injection 4 mg  4 mg intravenous Once PRN Luis Lofton DO        [DISCONTINUED] sodium chloride 0.9% infusion  100 mL/hr intravenous Continuous Sophie Calix MD            Vitals:    12/13/23 1003   BP: 116/74   Pulse: 82   Temp: 36.1 °C (97 °F)   SpO2: 99%       Review of Systems   All other systems reviewed and are negative.      Objective     Physical Exam  Constitutional:       Appearance: Normal appearance. She is well-developed.   HENT:      Head: Atraumatic.   Cardiovascular:      Rate and Rhythm: Normal rate and regular rhythm.      Heart sounds: Normal heart sounds. No murmur heard.  Pulmonary:      Effort: Pulmonary effort is normal.      Breath sounds: Normal breath sounds.   Abdominal:      General: Bowel sounds are normal.      Palpations: Abdomen is soft.   Skin:     General: Skin is warm.   Neurological:      General: No focal deficit present.      Mental Status: She is alert.   Psychiatric:         Mood and Affect: Mood normal.         Admission on 12/12/2023, Discharged on 12/12/2023   Component Date Value Ref Range Status    Preg Test, Ur 12/12/2023 Negative  Negative In process    WBC 12/12/2023 6.8  4.4 - 11.3 x10*3/uL Final    nRBC 12/12/2023 0.0  0.0 - 0.0 /100 WBCs Final    RBC 12/12/2023 3.80 (L)  4.00 - 5.20 x10*6/uL Final    Hemoglobin 12/12/2023 10.7 (L)  12.0 - 16.0 g/dL Final    Hematocrit 12/12/2023 33.0 (L)  36.0 - 46.0 % Final    MCV 12/12/2023 87  80 - 100 fL Final    MCH 12/12/2023 28.2  26.0 - 34.0 pg Final    MCHC 12/12/2023 32.4  32.0 - 36.0 g/dL Final    RDW 12/12/2023 13.9  11.5 - 14.5 % Final    Platelets 12/12/2023 243  150 - 450 x10*3/uL Final    Ventricular Rate 12/12/2023 65  BPM Final    Atrial Rate  12/12/2023 64  BPM Final    OR Interval 12/12/2023 135  ms Final    QRS Duration 12/12/2023 77  ms Final    QT Interval 12/12/2023 391  ms Final    QTC Calculation(Bazett) 12/12/2023 407  ms Final    P Axis 12/12/2023 69  degrees Final    R Axis 12/12/2023 68  degrees Final    T Axis 12/12/2023 38  degrees Final    QRS Count 12/12/2023 11  beats Final    Q Onset 12/12/2023 249  ms Final    T Offset 12/12/2023 445  ms Final    QTC Fredericia 12/12/2023 401  ms Final       Assessment/Plan   Problem List Items Addressed This Visit       Attention deficit hyperactivity disorder, predominantly inattentive type    Relevant Medications    amphetamine-dextroamphetamine (Adderall) 15 mg tablet    amphetamine-dextroamphetamine (Adderall) 15 mg tablet (Start on 1/12/2024)    amphetamine-dextroamphetamine (Adderall) 15 mg tablet (Start on 2/11/2024)     I personally reviewed the OARRS report for this patient. I have considered the risks of abuse, dependence, addiction, and diversion. I believe that it is clinically appropriate for this patient to be prescribed this medication based on documented diagnosis.     Patient is doing well.  Refilled pts meds.  Follow up in 3 mo.

## 2023-12-22 LAB
LABORATORY COMMENT REPORT: NORMAL
PATH REPORT.FINAL DX SPEC: NORMAL
PATH REPORT.GROSS SPEC: NORMAL
PATH REPORT.RELEVANT HX SPEC: NORMAL
PATH REPORT.TOTAL CANCER: NORMAL

## 2024-01-22 ENCOUNTER — APPOINTMENT (OUTPATIENT)
Dept: SURGERY | Facility: CLINIC | Age: 29
End: 2024-01-22
Payer: COMMERCIAL

## 2024-01-26 ENCOUNTER — OFFICE VISIT (OUTPATIENT)
Dept: SURGERY | Facility: CLINIC | Age: 29
End: 2024-01-26
Payer: COMMERCIAL

## 2024-01-26 VITALS
OXYGEN SATURATION: 99 % | WEIGHT: 116 LBS | SYSTOLIC BLOOD PRESSURE: 114 MMHG | HEIGHT: 64 IN | BODY MASS INDEX: 19.81 KG/M2 | DIASTOLIC BLOOD PRESSURE: 72 MMHG | HEART RATE: 79 BPM

## 2024-01-26 DIAGNOSIS — K62.5 RECTAL BLEEDING: ICD-10-CM

## 2024-01-26 DIAGNOSIS — K64.8 INTERNAL AND EXTERNAL BLEEDING HEMORRHOIDS: Primary | ICD-10-CM

## 2024-01-26 DIAGNOSIS — K64.4 INTERNAL AND EXTERNAL BLEEDING HEMORRHOIDS: Primary | ICD-10-CM

## 2024-01-26 PROCEDURE — 99024 POSTOP FOLLOW-UP VISIT: CPT | Performed by: SURGERY

## 2024-01-26 PROCEDURE — 1036F TOBACCO NON-USER: CPT | Performed by: SURGERY

## 2024-01-26 ASSESSMENT — ENCOUNTER SYMPTOMS
RECTAL PAIN: 0
VOMITING: 0
DIARRHEA: 0
FEVER: 0
SHORTNESS OF BREATH: 0
CHILLS: 0
COUGH: 0
PALPITATIONS: 0
DIZZINESS: 0
ABDOMINAL PAIN: 0
NAUSEA: 0
HEADACHES: 0
ANAL BLEEDING: 0
BLOOD IN STOOL: 0
CONSTIPATION: 0

## 2024-01-26 NOTE — PROGRESS NOTES
GENERAL SURGERY CLINIC NOTE    Heena Varner   1995   [id number]     History Of Present Illness  Heena Varner is a 28 y.o. female who presents to the office for follow up of rectal bleeding and hemorrhoids. She has had hemorrhoids since 2420-8424 and underwent colonoscopy at that time in [place] for evaluation of rectal bleeding. The only abnormalities she recall being noted were hemorrhoids. She has a history of IBS and alternates between very watery diarrhea and significant constipation, which has since improved. She also experiences occasional crampy abdominal pain. She typically has a small amount of blood with bowel movements, but earlier this year, the volume has increased significantly. She was previously evaluated 6/28/23 and the right posterior internal hemorrhoids were banded. The bleeding stopped for 3 weeks, and then resumed. She slept through her follow up appointment and then did not follow up due to her stepfather passing away. In the last 1-2 months, she has had significantly increased volume of bleeding even without having bowel movements.     She underwent rectal examination under anesthesia and hemorrhoidectomy of the left lateral and right anterior groups 12/12/23. She thinks there is a small amount of tissue anteriorly remaining. However, she has experienced no bleeding or pain.      Past Medical History  She has a past medical history of Acute pharyngitis (03/07/2023), Personal history of other diseases of the circulatory system, Personal history of other diseases of the digestive system, Personal history of other diseases of the respiratory system, and Personal history of other drug therapy.    Surgical History  She has a past surgical history that includes Other surgical history (09/07/2017) and Colonoscopy (09/12/2017).    Medications  Current Outpatient Medications on File Prior to Visit   Medication Sig Dispense Refill    albuterol 2.5 mg /3 mL (0.083 %) nebulizer solution Take 3 mL  (2.5 mg) by nebulization every 4 hours if needed (Asthma).      amphetamine-dextroamphetamine (Adderall) 15 mg tablet Take 1 tablet (15 mg) by mouth 2 times daily after breakfast and lunch. 60 tablet 0    amphetamine-dextroamphetamine (Adderall) 15 mg tablet Take 1 tablet (15 mg) by mouth 2 times daily after breakfast and lunch. Do not start before January 12, 2024. 60 tablet 0    [START ON 2/11/2024] amphetamine-dextroamphetamine (Adderall) 15 mg tablet Take 1 tablet (15 mg) by mouth 2 times daily after breakfast and lunch. Do not start before February 11, 2024. 60 tablet 0    Aviane 0.1-20 mg-mcg tablet Take by mouth.      citalopram (CeleXA) 20 mg tablet Take 1 tablet (20 mg) by mouth once daily. 90 tablet 3    Clindagel 1 % gel, once daily Apply topically 2 times a day.      Drysol Dab-O-Matic 20 % external solution Apply topically once daily. apply to axilla      loratadine-pseudoephedrine (Loratadine-D)  mg 24 hr tablet Take 1 tablet by mouth once daily.      oxyCODONE-acetaminophen (Percocet) 5-325 mg tablet Take 1 tablet by mouth every 6 hours if needed for severe pain (7 - 10). 15 tablet 0    sulfacetamide sodium-sulfur (Avar, Plexion) 10-5 % (w/w) topical emulsion wash face twice daily as directed      tazarotene (Tazorac) 0.1 % gel apply to face every night sparingly      traZODone (Desyrel) 50 mg tablet Take 1 tablet (50 mg) by mouth once daily at bedtime. 90 tablet 1     No current facility-administered medications on file prior to visit.       Allergies  Bee venom protein (honey bee)     Social History  She reports that she has never smoked. She has never used smokeless tobacco. She reports that she does not currently use alcohol. She reports that she does not use drugs.    Family History  Family History   Problem Relation Name Age of Onset    Arthritis Mother Carley Villegas     Depression Mother Carley Villegas     Alcohol abuse Father Gene Ramirez     Depression Father Gene James      Depression Brother Mitchell albright     Heart disease Mother's Brother Aldo Varner         Review of Systems   Constitutional:  Negative for chills and fever.   Respiratory:  Negative for cough and shortness of breath.    Cardiovascular:  Negative for chest pain and palpitations.   Gastrointestinal:  Negative for abdominal pain, anal bleeding, blood in stool, constipation, diarrhea, nausea, rectal pain and vomiting.   Neurological:  Negative for dizziness and headaches.   All other systems reviewed and are negative.      Last Recorded Vitals  There were no vitals taken for this visit.     Physical Exam  Constitutional:       General: She is not in acute distress.     Appearance: Normal appearance. She is not ill-appearing.   HENT:      Head: Normocephalic and atraumatic.   Cardiovascular:      Rate and Rhythm: Normal rate and regular rhythm.   Pulmonary:      Effort: Pulmonary effort is normal. No respiratory distress.      Breath sounds: Normal breath sounds.   Abdominal:      General: There is no distension.      Palpations: Abdomen is soft.      Tenderness: There is no abdominal tenderness. There is no guarding.   Genitourinary:     Comments: External rectal exam: small, nonthrombosed right anterior external hemorrhoid. Extremely small, non thrombosed left lateral external hemorrhoid  Musculoskeletal:         General: No swelling.   Skin:     General: Skin is warm and dry.   Neurological:      Mental Status: She is alert and oriented to person, place, and time. Mental status is at baseline.   Psychiatric:         Mood and Affect: Mood normal.         Behavior: Behavior normal.       Assessment and Plan  28 y.o. female with bleeding internal hemorrhoids status post hemorrhoidectomy of the left lateral and right anterior groups. She has some remaining hemorrhoidal tissue on examination, but is now asymptomatic. I discussed that additional surgeries are not recommended unless her symptoms return and persist. She  expressed her understanding and all questions were answered. Follow up on an as needed basis.     Sophie Calix MD, FACS  General Surgery

## 2024-03-05 ENCOUNTER — APPOINTMENT (OUTPATIENT)
Dept: PRIMARY CARE | Facility: CLINIC | Age: 29
End: 2024-03-05
Payer: COMMERCIAL

## 2024-03-19 ENCOUNTER — OFFICE VISIT (OUTPATIENT)
Dept: PRIMARY CARE | Facility: CLINIC | Age: 29
End: 2024-03-19
Payer: COMMERCIAL

## 2024-03-19 VITALS
OXYGEN SATURATION: 98 % | TEMPERATURE: 97.4 F | SYSTOLIC BLOOD PRESSURE: 118 MMHG | HEART RATE: 78 BPM | DIASTOLIC BLOOD PRESSURE: 70 MMHG

## 2024-03-19 DIAGNOSIS — F90.0 ATTENTION DEFICIT HYPERACTIVITY DISORDER, PREDOMINANTLY INATTENTIVE TYPE: ICD-10-CM

## 2024-03-19 DIAGNOSIS — J06.9 UPPER RESPIRATORY TRACT INFECTION, UNSPECIFIED TYPE: Primary | ICD-10-CM

## 2024-03-19 PROBLEM — K62.5 RECTAL BLEEDING: Status: RESOLVED | Noted: 2023-11-17 | Resolved: 2024-03-19

## 2024-03-19 PROCEDURE — 1036F TOBACCO NON-USER: CPT | Performed by: FAMILY MEDICINE

## 2024-03-19 PROCEDURE — 99213 OFFICE O/P EST LOW 20 MIN: CPT | Performed by: FAMILY MEDICINE

## 2024-03-19 RX ORDER — TRETINOIN 1 MG/G
CREAM TOPICAL
COMMUNITY
Start: 2024-02-03

## 2024-03-19 RX ORDER — DEXTROAMPHETAMINE SACCHARATE, AMPHETAMINE ASPARTATE, DEXTROAMPHETAMINE SULFATE AND AMPHETAMINE SULFATE 3.75; 3.75; 3.75; 3.75 MG/1; MG/1; MG/1; MG/1
15 TABLET ORAL
Qty: 60 TABLET | Refills: 0 | Status: SHIPPED | OUTPATIENT
Start: 2024-04-19 | End: 2024-05-19

## 2024-03-19 RX ORDER — DEXTROAMPHETAMINE SACCHARATE, AMPHETAMINE ASPARTATE, DEXTROAMPHETAMINE SULFATE AND AMPHETAMINE SULFATE 3.75; 3.75; 3.75; 3.75 MG/1; MG/1; MG/1; MG/1
15 TABLET ORAL
Qty: 60 TABLET | Refills: 0 | Status: SHIPPED | OUTPATIENT
Start: 2024-03-19 | End: 2024-04-18

## 2024-03-19 RX ORDER — DEXTROAMPHETAMINE SACCHARATE, AMPHETAMINE ASPARTATE, DEXTROAMPHETAMINE SULFATE AND AMPHETAMINE SULFATE 3.75; 3.75; 3.75; 3.75 MG/1; MG/1; MG/1; MG/1
15 TABLET ORAL
Qty: 60 TABLET | Refills: 0 | Status: SHIPPED | OUTPATIENT
Start: 2024-05-19 | End: 2024-06-18

## 2024-03-19 NOTE — PROGRESS NOTES
Subjective   Patient ID: Heena Varner is a 29 y.o. female who presents for Anxiety, ADD (Recheck.), Ear Drainage, and Nasal Congestion (X 1 week).    ADD: doing well.  Refilled meds.      URI: cough and congestion x 1 week    Current Outpatient Medications on File Prior to Visit   Medication Sig Dispense Refill    albuterol 2.5 mg /3 mL (0.083 %) nebulizer solution Take 3 mL (2.5 mg) by nebulization every 4 hours if needed (Asthma).      Aviane 0.1-20 mg-mcg tablet Take by mouth.      citalopram (CeleXA) 20 mg tablet Take 1 tablet (20 mg) by mouth once daily. 90 tablet 3    Clindagel 1 % gel, once daily Apply topically 2 times a day.      Drysol Dab-O-Matic 20 % external solution Apply topically once daily. apply to axilla      loratadine-pseudoephedrine (Loratadine-D)  mg 24 hr tablet Take 1 tablet by mouth once daily.      sulfacetamide sodium-sulfur (Avar, Plexion) 10-5 % (w/w) topical emulsion wash face twice daily as directed      tazarotene (Tazorac) 0.1 % gel apply to face every night sparingly      traZODone (Desyrel) 50 mg tablet Take 1 tablet (50 mg) by mouth once daily at bedtime. 90 tablet 1    tretinoin (Retin-A) 0.1 % cream APPLY SPARINGLY TOPICALLY TO ENTIRE FACE EVERY NIGHT      amphetamine-dextroamphetamine (Adderall) 15 mg tablet Take 1 tablet (15 mg) by mouth 2 times daily after breakfast and lunch. 60 tablet 0    amphetamine-dextroamphetamine (Adderall) 15 mg tablet Take 1 tablet (15 mg) by mouth 2 times daily after breakfast and lunch. Do not start before January 12, 2024. 60 tablet 0    [DISCONTINUED] amphetamine-dextroamphetamine (Adderall) 15 mg tablet Take 1 tablet (15 mg) by mouth 2 times daily after breakfast and lunch. Do not start before February 11, 2024. 60 tablet 0     No current facility-administered medications on file prior to visit.        Vitals:    03/19/24 1215   BP: 118/70   Pulse: 78   Temp: 36.3 °C (97.4 °F)   SpO2: 98%       Review of Systems   All other systems  reviewed and are negative.      Objective     Physical Exam  Constitutional:       Appearance: Normal appearance. She is well-developed.   HENT:      Head: Atraumatic.   Cardiovascular:      Rate and Rhythm: Normal rate and regular rhythm.      Heart sounds: Normal heart sounds. No murmur heard.  Pulmonary:      Effort: Pulmonary effort is normal.      Breath sounds: Normal breath sounds.   Abdominal:      General: Bowel sounds are normal.      Palpations: Abdomen is soft.   Skin:     General: Skin is warm.   Neurological:      General: No focal deficit present.      Mental Status: She is alert.   Psychiatric:         Mood and Affect: Mood normal.         No visits with results within 2 Month(s) from this visit.   Latest known visit with results is:   Admission on 12/12/2023, Discharged on 12/12/2023   Component Date Value Ref Range Status    Preg Test, Ur 12/12/2023 Negative  Negative In process    WBC 12/12/2023 6.8  4.4 - 11.3 x10*3/uL Final    nRBC 12/12/2023 0.0  0.0 - 0.0 /100 WBCs Final    RBC 12/12/2023 3.80 (L)  4.00 - 5.20 x10*6/uL Final    Hemoglobin 12/12/2023 10.7 (L)  12.0 - 16.0 g/dL Final    Hematocrit 12/12/2023 33.0 (L)  36.0 - 46.0 % Final    MCV 12/12/2023 87  80 - 100 fL Final    MCH 12/12/2023 28.2  26.0 - 34.0 pg Final    MCHC 12/12/2023 32.4  32.0 - 36.0 g/dL Final    RDW 12/12/2023 13.9  11.5 - 14.5 % Final    Platelets 12/12/2023 243  150 - 450 x10*3/uL Final    Ventricular Rate 12/12/2023 65  BPM Final    Atrial Rate 12/12/2023 64  BPM Final    NE Interval 12/12/2023 135  ms Final    QRS Duration 12/12/2023 77  ms Final    QT Interval 12/12/2023 391  ms Final    QTC Calculation(Bazett) 12/12/2023 407  ms Final    P Axis 12/12/2023 69  degrees Final    R Axis 12/12/2023 68  degrees Final    T Axis 12/12/2023 38  degrees Final    QRS Count 12/12/2023 11  beats Final    Q Onset 12/12/2023 249  ms Final    T Offset 12/12/2023 445  ms Final    QTC Fredericia 12/12/2023 401  ms Final    Case  Report 12/12/2023    Final                    Value:Surgical Pathology                                Case: [id number]                                  Authorizing Provider:  Sophie Calix MD          Collected:           12/12/2023 0852              Ordering Location:     [facility]  Received:            12/12/2023 1030                                     OR                                                                           Pathologist:           Kimberly Aguayo MD                                                         Specimens:   A) - HEMORRHOID, LEFT LATERAL HEMORIDIAL GROUP                                                      B) - HEMORRHOID, RIGHT ANTERIOR HEMORIDIAL GROUP                                           FINAL DIAGNOSIS 12/12/2023    Final                    Value:This result contains rich text formatting which cannot be displayed here.      12/12/2023    Final                    Value:This result contains rich text formatting which cannot be displayed here.    Clinical History 12/12/2023    Final                    Value:This result contains rich text formatting which cannot be displayed here.    Gross Description 12/12/2023    Final                    Value:This result contains rich text formatting which cannot be displayed here.       Assessment/Plan   Problem List Items Addressed This Visit       Attention deficit hyperactivity disorder, predominantly inattentive type    Relevant Medications    amphetamine-dextroamphetamine (Adderall) 15 mg tablet    amphetamine-dextroamphetamine (Adderall) 15 mg tablet (Start on 4/19/2024)    amphetamine-dextroamphetamine (Adderall) 15 mg tablet (Start on 5/19/2024)     Other Visit Diagnoses       Upper respiratory tract infection, unspecified type    -  Primary          I personally reviewed the OARRS report for this patient. I have considered the risks of abuse, dependence, addiction, and diversion. I believe that it is clinically  appropriate for this patient to be prescribed this medication based on documented diagnosis.     Patient is doing well.  Refilled pts meds.  Follow up in 3.5 mo.  Try [place]  HPI

## 2024-04-08 DIAGNOSIS — F51.01 PRIMARY INSOMNIA: ICD-10-CM

## 2024-04-08 RX ORDER — TRAZODONE HYDROCHLORIDE 50 MG/1
50 TABLET ORAL NIGHTLY
Qty: 90 TABLET | Refills: 0 | Status: SHIPPED | OUTPATIENT
Start: 2024-04-08

## 2024-06-25 ENCOUNTER — APPOINTMENT (OUTPATIENT)
Dept: PRIMARY CARE | Facility: CLINIC | Age: 29
End: 2024-06-25
Payer: COMMERCIAL

## 2024-06-25 VITALS
WEIGHT: 118 LBS | OXYGEN SATURATION: 100 % | HEART RATE: 91 BPM | TEMPERATURE: 97.1 F | SYSTOLIC BLOOD PRESSURE: 122 MMHG | DIASTOLIC BLOOD PRESSURE: 72 MMHG | BODY MASS INDEX: 20.25 KG/M2

## 2024-06-25 DIAGNOSIS — F90.0 ATTENTION DEFICIT HYPERACTIVITY DISORDER, PREDOMINANTLY INATTENTIVE TYPE: ICD-10-CM

## 2024-06-25 PROCEDURE — 99213 OFFICE O/P EST LOW 20 MIN: CPT | Performed by: FAMILY MEDICINE

## 2024-06-25 RX ORDER — DEXTROAMPHETAMINE SACCHARATE, AMPHETAMINE ASPARTATE, DEXTROAMPHETAMINE SULFATE AND AMPHETAMINE SULFATE 3.75; 3.75; 3.75; 3.75 MG/1; MG/1; MG/1; MG/1
15 TABLET ORAL
Qty: 60 TABLET | Refills: 0 | Status: SHIPPED | OUTPATIENT
Start: 2024-06-25 | End: 2024-07-25

## 2024-06-25 RX ORDER — DEXTROAMPHETAMINE SACCHARATE, AMPHETAMINE ASPARTATE, DEXTROAMPHETAMINE SULFATE AND AMPHETAMINE SULFATE 3.75; 3.75; 3.75; 3.75 MG/1; MG/1; MG/1; MG/1
15 TABLET ORAL
Qty: 60 TABLET | Refills: 0 | Status: SHIPPED | OUTPATIENT
Start: 2024-06-25 | End: 2024-06-25 | Stop reason: SDUPTHER

## 2024-06-25 RX ORDER — DEXTROAMPHETAMINE SACCHARATE, AMPHETAMINE ASPARTATE, DEXTROAMPHETAMINE SULFATE AND AMPHETAMINE SULFATE 3.75; 3.75; 3.75; 3.75 MG/1; MG/1; MG/1; MG/1
15 TABLET ORAL
Qty: 60 TABLET | Refills: 0 | Status: SHIPPED | OUTPATIENT
Start: 2024-07-25 | End: 2024-06-25 | Stop reason: SDUPTHER

## 2024-06-25 RX ORDER — DEXTROAMPHETAMINE SACCHARATE, AMPHETAMINE ASPARTATE, DEXTROAMPHETAMINE SULFATE AND AMPHETAMINE SULFATE 3.75; 3.75; 3.75; 3.75 MG/1; MG/1; MG/1; MG/1
15 TABLET ORAL
Qty: 60 TABLET | Refills: 0 | Status: SHIPPED | OUTPATIENT
Start: 2024-08-25 | End: 2024-06-25 | Stop reason: SDUPTHER

## 2024-06-25 RX ORDER — DEXTROAMPHETAMINE SACCHARATE, AMPHETAMINE ASPARTATE, DEXTROAMPHETAMINE SULFATE AND AMPHETAMINE SULFATE 3.75; 3.75; 3.75; 3.75 MG/1; MG/1; MG/1; MG/1
15 TABLET ORAL
Qty: 60 TABLET | Refills: 0 | Status: SHIPPED | OUTPATIENT
Start: 2024-08-25 | End: 2024-09-24

## 2024-06-25 RX ORDER — DEXTROAMPHETAMINE SACCHARATE, AMPHETAMINE ASPARTATE, DEXTROAMPHETAMINE SULFATE AND AMPHETAMINE SULFATE 3.75; 3.75; 3.75; 3.75 MG/1; MG/1; MG/1; MG/1
15 TABLET ORAL
Qty: 60 TABLET | Refills: 0 | Status: SHIPPED | OUTPATIENT
Start: 2024-07-25 | End: 2024-08-24

## 2024-06-25 NOTE — PROGRESS NOTES
Subjective   Patient ID: Heena Varner is a 29 y.o. female who presents for ADD (recheck).    ADD: doing well.  Refilled meds.        Current Outpatient Medications on File Prior to Visit   Medication Sig Dispense Refill    albuterol 2.5 mg /3 mL (0.083 %) nebulizer solution Take 3 mL (2.5 mg) by nebulization every 4 hours if needed (Asthma).      Aviane 0.1-20 mg-mcg tablet Take by mouth.      citalopram (CeleXA) 20 mg tablet Take 1 tablet (20 mg) by mouth once daily. 90 tablet 3    Clindagel 1 % gel, once daily Apply topically 2 times a day.      Drysol Dab-O-Matic 20 % external solution Apply topically once daily. apply to axilla      loratadine-pseudoephedrine (Loratadine-D)  mg 24 hr tablet Take 1 tablet by mouth once daily.      sulfacetamide sodium-sulfur (Avar, Plexion) 10-5 % (w/w) topical emulsion wash face twice daily as directed      tazarotene (Tazorac) 0.1 % gel apply to face every night sparingly      traZODone (Desyrel) 50 mg tablet TAKE 1 TABLET(50 MG) BY MOUTH EVERY DAY AT BEDTIME 90 tablet 0    tretinoin (Retin-A) 0.1 % cream APPLY SPARINGLY TOPICALLY TO ENTIRE FACE EVERY NIGHT      [DISCONTINUED] amphetamine-dextroamphetamine (Adderall) 15 mg tablet Take 1 tablet (15 mg) by mouth 2 times daily after breakfast and lunch. 60 tablet 0    [DISCONTINUED] amphetamine-dextroamphetamine (Adderall) 15 mg tablet Take 1 tablet (15 mg) by mouth 2 times daily after breakfast and lunch. Do not start before January 12, 2024. 60 tablet 0    [DISCONTINUED] amphetamine-dextroamphetamine (Adderall) 15 mg tablet Take 1 tablet (15 mg) by mouth 2 times daily after breakfast and lunch. 60 tablet 0    [DISCONTINUED] amphetamine-dextroamphetamine (Adderall) 15 mg tablet Take 1 tablet (15 mg) by mouth 2 times daily after breakfast and lunch. Do not start before April 19, 2024. 60 tablet 0    [DISCONTINUED] amphetamine-dextroamphetamine (Adderall) 15 mg tablet Take 1 tablet (15 mg) by mouth 2 times daily after  breakfast and lunch. Do not start before May 19, 2024. 60 tablet 0     No current facility-administered medications on file prior to visit.        Vitals:    06/25/24 1104   BP: 122/72   Pulse: 91   Temp: 36.2 °C (97.1 °F)   SpO2: 100%       Review of Systems   All other systems reviewed and are negative.      Objective     Physical Exam  Constitutional:       Appearance: Normal appearance. She is well-developed.   HENT:      Head: Atraumatic.   Cardiovascular:      Rate and Rhythm: Normal rate and regular rhythm.      Heart sounds: Normal heart sounds. No murmur heard.  Pulmonary:      Effort: Pulmonary effort is normal.      Breath sounds: Normal breath sounds.   Abdominal:      General: Bowel sounds are normal.      Palpations: Abdomen is soft.   Skin:     General: Skin is warm.   Neurological:      General: No focal deficit present.      Mental Status: She is alert.   Psychiatric:         Mood and Affect: Mood normal.         No visits with results within 2 Month(s) from this visit.   Latest known visit with results is:   Admission on 12/12/2023, Discharged on 12/12/2023   Component Date Value Ref Range Status    Preg Test, Ur 12/12/2023 Negative  Negative In process    WBC 12/12/2023 6.8  4.4 - 11.3 x10*3/uL Final    nRBC 12/12/2023 0.0  0.0 - 0.0 /100 WBCs Final    RBC 12/12/2023 3.80 (L)  4.00 - 5.20 x10*6/uL Final    Hemoglobin 12/12/2023 10.7 (L)  12.0 - 16.0 g/dL Final    Hematocrit 12/12/2023 33.0 (L)  36.0 - 46.0 % Final    MCV 12/12/2023 87  80 - 100 fL Final    MCH 12/12/2023 28.2  26.0 - 34.0 pg Final    MCHC 12/12/2023 32.4  32.0 - 36.0 g/dL Final    RDW 12/12/2023 13.9  11.5 - 14.5 % Final    Platelets 12/12/2023 243  150 - 450 x10*3/uL Final    Ventricular Rate 12/12/2023 65  BPM Final    Atrial Rate 12/12/2023 64  BPM Final    MT Interval 12/12/2023 135  ms Final    QRS Duration 12/12/2023 77  ms Final    QT Interval 12/12/2023 391  ms Final    QTC Calculation(Bazett) 12/12/2023 407  ms Final    P  Axis 12/12/2023 69  degrees Final    R Axis 12/12/2023 68  degrees Final    T Axis 12/12/2023 38  degrees Final    QRS Count 12/12/2023 11  beats Final    Q Onset 12/12/2023 249  ms Final    T Offset 12/12/2023 445  ms Final    QTC Fredericia 12/12/2023 401  ms Final    Case Report 12/12/2023    Final                    Value:Surgical Pathology                                Case: [id number]                                  Authorizing Provider:  Sophie Calix MD          Collected:           12/12/2023 0852              Ordering Location:     [facility]  Received:            12/12/2023 1030                                     OR                                                                           Pathologist:           Kimberly Aguayo MD                                                         Specimens:   A) - HEMORRHOID, LEFT LATERAL HEMORIDIAL GROUP                                                      B) - HEMORRHOID, RIGHT ANTERIOR HEMORIDIAL GROUP                                           FINAL DIAGNOSIS 12/12/2023    Final                    Value:This result contains rich text formatting which cannot be displayed here.      12/12/2023    Final                    Value:This result contains rich text formatting which cannot be displayed here.    Clinical History 12/12/2023    Final                    Value:This result contains rich text formatting which cannot be displayed here.    Gross Description 12/12/2023    Final                    Value:This result contains rich text formatting which cannot be displayed here.       Assessment/Plan   Problem List Items Addressed This Visit       Attention deficit hyperactivity disorder, predominantly inattentive type    Relevant Medications    amphetamine-dextroamphetamine (Adderall) 15 mg tablet (Start on 8/25/2024)    amphetamine-dextroamphetamine (Adderall) 15 mg tablet (Start on 7/25/2024)    amphetamine-dextroamphetamine (Adderall) 15 mg tablet        I personally reviewed the OARRS report for this patient. I have considered the risks of abuse, dependence, addiction, and diversion. I believe that it is clinically appropriate for this patient to be prescribed this medication based on documented diagnosis.     Patient is doing well.  Refilled pts meds.  Follow up in 3.5 mo.  T

## 2024-06-25 NOTE — PROGRESS NOTES
Subjective   Patient ID: Heena Varner is a 29 y.o. female who presents for No chief complaint on file..  HPI    Patient Active Problem List   Diagnosis    Acne    Anxiety state    Attention deficit hyperactivity disorder, predominantly inattentive type    Generalized hyperhidrosis    Depression    Internal and external bleeding hemorrhoids       Social Connections: Not on file       Current Outpatient Medications on File Prior to Visit   Medication Sig Dispense Refill    albuterol 2.5 mg /3 mL (0.083 %) nebulizer solution Take 3 mL (2.5 mg) by nebulization every 4 hours if needed (Asthma).      [START ON 8/25/2024] amphetamine-dextroamphetamine (Adderall) 15 mg tablet Take 1 tablet (15 mg) by mouth 2 times daily (morning and midday). Do not fill before August 25, 2024. 60 tablet 0    [START ON 7/25/2024] amphetamine-dextroamphetamine (Adderall) 15 mg tablet Take 1 tablet (15 mg) by mouth 2 times daily (morning and midday). Do not fill before July 25, 2024. 60 tablet 0    amphetamine-dextroamphetamine (Adderall) 15 mg tablet Take 1 tablet (15 mg) by mouth 2 times daily (morning and midday). 60 tablet 0    Aviane 0.1-20 mg-mcg tablet Take by mouth.      citalopram (CeleXA) 20 mg tablet Take 1 tablet (20 mg) by mouth once daily. 90 tablet 3    Clindagel 1 % gel, once daily Apply topically 2 times a day.      Drysol Dab-O-Matic 20 % external solution Apply topically once daily. apply to axilla      loratadine-pseudoephedrine (Loratadine-D)  mg 24 hr tablet Take 1 tablet by mouth once daily.      sulfacetamide sodium-sulfur (Avar, Plexion) 10-5 % (w/w) topical emulsion wash face twice daily as directed      tazarotene (Tazorac) 0.1 % gel apply to face every night sparingly      traZODone (Desyrel) 50 mg tablet TAKE 1 TABLET(50 MG) BY MOUTH EVERY DAY AT BEDTIME 90 tablet 0    tretinoin (Retin-A) 0.1 % cream APPLY SPARINGLY TOPICALLY TO ENTIRE FACE EVERY NIGHT      [DISCONTINUED] amphetamine-dextroamphetamine  (Adderall) 15 mg tablet Take 1 tablet (15 mg) by mouth 2 times daily after breakfast and lunch. 60 tablet 0    [DISCONTINUED] amphetamine-dextroamphetamine (Adderall) 15 mg tablet Take 1 tablet (15 mg) by mouth 2 times daily after breakfast and lunch. Do not start before January 12, 2024. 60 tablet 0    [DISCONTINUED] amphetamine-dextroamphetamine (Adderall) 15 mg tablet Take 1 tablet (15 mg) by mouth 2 times daily after breakfast and lunch. 60 tablet 0    [DISCONTINUED] amphetamine-dextroamphetamine (Adderall) 15 mg tablet Take 1 tablet (15 mg) by mouth 2 times daily after breakfast and lunch. Do not start before April 19, 2024. 60 tablet 0    [DISCONTINUED] amphetamine-dextroamphetamine (Adderall) 15 mg tablet Take 1 tablet (15 mg) by mouth 2 times daily after breakfast and lunch. Do not start before May 19, 2024. 60 tablet 0     No current facility-administered medications on file prior to visit.        There were no vitals filed for this visit.  There were no vitals filed for this visit.    Review of Systems    Objective     Physical Exam    No visits with results within 2 Month(s) from this visit.   Latest known visit with results is:   Admission on 12/12/2023, Discharged on 12/12/2023   Component Date Value Ref Range Status    Preg Test, Ur 12/12/2023 Negative  Negative In process    WBC 12/12/2023 6.8  4.4 - 11.3 x10*3/uL Final    nRBC 12/12/2023 0.0  0.0 - 0.0 /100 WBCs Final    RBC 12/12/2023 3.80 (L)  4.00 - 5.20 x10*6/uL Final    Hemoglobin 12/12/2023 10.7 (L)  12.0 - 16.0 g/dL Final    Hematocrit 12/12/2023 33.0 (L)  36.0 - 46.0 % Final    MCV 12/12/2023 87  80 - 100 fL Final    MCH 12/12/2023 28.2  26.0 - 34.0 pg Final    MCHC 12/12/2023 32.4  32.0 - 36.0 g/dL Final    RDW 12/12/2023 13.9  11.5 - 14.5 % Final    Platelets 12/12/2023 243  150 - 450 x10*3/uL Final    Ventricular Rate 12/12/2023 65  BPM Final    Atrial Rate 12/12/2023 64  BPM Final    [place] Interval 12/12/2023 135  ms Final    QRS Duration  12/12/2023 77  ms Final    QT Interval 12/12/2023 391  ms Final    QTC Calculation(Bazett) 12/12/2023 407  ms Final    P Axis 12/12/2023 69  degrees Final    R Axis 12/12/2023 68  degrees Final    T Axis 12/12/2023 38  degrees Final    QRS Count 12/12/2023 11  beats Final    Q Onset 12/12/2023 249  ms Final    T Offset 12/12/2023 445  ms Final    QTC Fredericia 12/12/2023 401  ms Final    Case Report 12/12/2023    Final                    Value:Surgical Pathology                                Case: [id number]                                  Authorizing Provider:  Sophie Calix MD          Collected:           12/12/2023 0852              Ordering Location:     [facility]  Received:            12/12/2023 1030                                     OR                                                                           Pathologist:           Kimberly Aguayo MD                                                         Specimens:   A) - HEMORRHOID, LEFT LATERAL HEMORIDIAL GROUP                                                      B) - HEMORRHOID, RIGHT ANTERIOR HEMORIDIAL GROUP                                           FINAL DIAGNOSIS 12/12/2023    Final                    Value:This result contains rich text formatting which cannot be displayed here.      12/12/2023    Final                    Value:This result contains rich text formatting which cannot be displayed here.    Clinical History 12/12/2023    Final                    Value:This result contains rich text formatting which cannot be displayed here.    Gross Description 12/12/2023    Final                    Value:This result contains rich text formatting which cannot be displayed here.       Assessment/Plan

## 2024-09-25 ENCOUNTER — APPOINTMENT (OUTPATIENT)
Dept: PRIMARY CARE | Facility: CLINIC | Age: 29
End: 2024-09-25
Payer: COMMERCIAL

## 2024-10-01 ENCOUNTER — APPOINTMENT (OUTPATIENT)
Dept: PRIMARY CARE | Facility: CLINIC | Age: 29
End: 2024-10-01
Payer: COMMERCIAL

## 2024-10-01 VITALS
WEIGHT: 113 LBS | HEART RATE: 76 BPM | TEMPERATURE: 97.6 F | SYSTOLIC BLOOD PRESSURE: 114 MMHG | BODY MASS INDEX: 19.4 KG/M2 | DIASTOLIC BLOOD PRESSURE: 70 MMHG

## 2024-10-01 DIAGNOSIS — R21 RASH AND NONSPECIFIC SKIN ERUPTION: Primary | ICD-10-CM

## 2024-10-01 DIAGNOSIS — F51.01 PRIMARY INSOMNIA: ICD-10-CM

## 2024-10-01 DIAGNOSIS — F90.0 ATTENTION DEFICIT HYPERACTIVITY DISORDER, PREDOMINANTLY INATTENTIVE TYPE: ICD-10-CM

## 2024-10-01 PROCEDURE — 1036F TOBACCO NON-USER: CPT | Performed by: FAMILY MEDICINE

## 2024-10-01 PROCEDURE — 99214 OFFICE O/P EST MOD 30 MIN: CPT | Performed by: FAMILY MEDICINE

## 2024-10-01 RX ORDER — DEXTROAMPHETAMINE SACCHARATE, AMPHETAMINE ASPARTATE, DEXTROAMPHETAMINE SULFATE AND AMPHETAMINE SULFATE 3.75; 3.75; 3.75; 3.75 MG/1; MG/1; MG/1; MG/1
15 TABLET ORAL
Qty: 60 TABLET | Refills: 0 | Status: SHIPPED | OUTPATIENT
Start: 2024-10-31 | End: 2024-11-30

## 2024-10-01 RX ORDER — CLINDAMYCIN 1 G/10ML
GEL TOPICAL 2 TIMES DAILY
Status: CANCELLED | OUTPATIENT
Start: 2024-10-01

## 2024-10-01 RX ORDER — DEXTROAMPHETAMINE SACCHARATE, AMPHETAMINE ASPARTATE, DEXTROAMPHETAMINE SULFATE AND AMPHETAMINE SULFATE 3.75; 3.75; 3.75; 3.75 MG/1; MG/1; MG/1; MG/1
15 TABLET ORAL
Qty: 60 TABLET | Refills: 0 | Status: SHIPPED | OUTPATIENT
Start: 2024-11-30 | End: 2024-12-30

## 2024-10-01 RX ORDER — TRAZODONE HYDROCHLORIDE 50 MG/1
50 TABLET ORAL NIGHTLY
Qty: 90 TABLET | Refills: 0 | Status: SHIPPED | OUTPATIENT
Start: 2024-10-01

## 2024-10-01 RX ORDER — DEXTROAMPHETAMINE SACCHARATE, AMPHETAMINE ASPARTATE, DEXTROAMPHETAMINE SULFATE AND AMPHETAMINE SULFATE 3.75; 3.75; 3.75; 3.75 MG/1; MG/1; MG/1; MG/1
15 TABLET ORAL
Qty: 60 TABLET | Refills: 0 | Status: SHIPPED | OUTPATIENT
Start: 2024-10-01 | End: 2024-10-31

## 2024-10-01 RX ORDER — CITALOPRAM 20 MG/1
20 TABLET, FILM COATED ORAL DAILY
Qty: 90 TABLET | Refills: 3 | Status: SHIPPED | OUTPATIENT
Start: 2024-10-01 | End: 2025-10-01

## 2024-10-01 RX ORDER — TRIAMCINOLONE ACETONIDE 1 MG/G
CREAM TOPICAL 2 TIMES DAILY
Qty: 30 G | Refills: 0 | Status: SHIPPED | OUTPATIENT
Start: 2024-10-01

## 2024-10-01 ASSESSMENT — PATIENT HEALTH QUESTIONNAIRE - PHQ9
1. LITTLE INTEREST OR PLEASURE IN DOING THINGS: NOT AT ALL
SUM OF ALL RESPONSES TO PHQ9 QUESTIONS 1 AND 2: 0
2. FEELING DOWN, DEPRESSED OR HOPELESS: NOT AT ALL

## 2024-10-01 NOTE — PROGRESS NOTES
Subjective   Patient ID: Heena Varner is a 29 y.o. female who presents for Anxiety, Depression, and Rash (Itchy Red bumps on neck x 1 week).    ADD: doing well.  Refilled meds.  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    Anxiety: well controlled    Hemorrhoid: no status post surgery.  Doing well.    Current Outpatient Medications on File Prior to Visit   Medication Sig Dispense Refill    albuterol 2.5 mg /3 mL (0.083 %) nebulizer solution Take 3 mL (2.5 mg) by nebulization every 4 hours if needed (Asthma).      Clindagel 1 % gel, once daily Apply topically 2 times a day.      Drysol Dab-O-Matic 20 % external solution Apply topically once daily. apply to axilla      loratadine-pseudoephedrine (Loratadine-D)  mg 24 hr tablet Take 1 tablet by mouth once daily.      sulfacetamide sodium-sulfur (Avar, Plexion) 10-5 % (w/w) topical emulsion wash face twice daily as directed      tazarotene (Tazorac) 0.1 % gel apply to face every night sparingly      tretinoin (Retin-A) 0.1 % cream APPLY SPARINGLY TOPICALLY TO ENTIRE FACE EVERY NIGHT      [DISCONTINUED] amphetamine-dextroamphetamine (Adderall) 15 mg tablet Take 1 tablet (15 mg) by mouth 2 times daily (morning and midday). 60 tablet 0    [DISCONTINUED] citalopram (CeleXA) 20 mg tablet Take 1 tablet (20 mg) by mouth once daily. 90 tablet 3    [DISCONTINUED] traZODone (Desyrel) 50 mg tablet TAKE 1 TABLET(50 MG) BY MOUTH EVERY DAY AT BEDTIME 90 tablet 0    Aviane 0.1-20 mg-mcg tablet Take by mouth.      [DISCONTINUED] amphetamine-dextroamphetamine (Adderall) 15 mg tablet Take 1 tablet (15 mg) by mouth 2 times daily (morning and midday). Do not fill before July 25, 2024. 60 tablet 0    [DISCONTINUED] amphetamine-dextroamphetamine (Adderall) 15 mg tablet Take 1 tablet (15 mg) by mouth 2 times daily (morning and midday). Do not fill before August 25, 2024. 60 tablet 0     No current facility-administered medications on file prior to visit.         Vitals:    10/01/24 1330   BP: 114/70   Pulse: 76   Temp: 36.4 °C (97.6 °F)       Review of Systems   All other systems reviewed and are negative.      Objective     Physical Exam  Constitutional:       Appearance: Normal appearance. She is well-developed.   HENT:      Head: Atraumatic.   Cardiovascular:      Rate and Rhythm: Normal rate and regular rhythm.      Heart sounds: Normal heart sounds. No murmur heard.  Pulmonary:      Effort: Pulmonary effort is normal.      Breath sounds: Normal breath sounds.   Abdominal:      General: Bowel sounds are normal.      Palpations: Abdomen is soft.   Skin:     General: Skin is warm.   Neurological:      General: No focal deficit present.      Mental Status: She is alert.   Psychiatric:         Mood and Affect: Mood normal.         No visits with results within 2 Month(s) from this visit.   Latest known visit with results is:   Admission on 12/12/2023, Discharged on 12/12/2023   Component Date Value Ref Range Status    Preg Test, Ur 12/12/2023 Negative  Negative In process    WBC 12/12/2023 6.8  4.4 - 11.3 x10*3/uL Final    nRBC 12/12/2023 0.0  0.0 - 0.0 /100 WBCs Final    RBC 12/12/2023 3.80 (L)  4.00 - 5.20 x10*6/uL Final    Hemoglobin 12/12/2023 10.7 (L)  12.0 - 16.0 g/dL Final    Hematocrit 12/12/2023 33.0 (L)  36.0 - 46.0 % Final    MCV 12/12/2023 87  80 - 100 fL Final    MCH 12/12/2023 28.2  26.0 - 34.0 pg Final    MCHC 12/12/2023 32.4  32.0 - 36.0 g/dL Final    RDW 12/12/2023 13.9  11.5 - 14.5 % Final    Platelets 12/12/2023 243  150 - 450 x10*3/uL Final    Ventricular Rate 12/12/2023 65  BPM Final    Atrial Rate 12/12/2023 64  BPM Final    [place] Interval 12/12/2023 135  ms Final    QRS Duration 12/12/2023 77  ms Final    QT Interval 12/12/2023 391  ms Final    QTC Calculation(Bazett) 12/12/2023 407  ms Final    P Axis 12/12/2023 69  degrees Final    R Axis 12/12/2023 68  degrees Final    T Axis 12/12/2023 38  degrees Final    QRS Count 12/12/2023 11  beats Final     "Q Onset 12/12/2023 249  ms Final    T Offset 12/12/2023 445  ms Final    QTC Fredericia 12/12/2023 401  ms Final    Case Report 12/12/2023    Final                    Value:Surgical Pathology                                Case: [id number]                                  Authorizing Provider:  Sophie Calix MD          Collected:           12/12/2023 0852              Ordering Location:     [facility]  Received:            12/12/2023 1030                                     OR                                                                           Pathologist:           Kimberly Aguayo MD                                                         Specimens:   A) - HEMORRHOID, LEFT LATERAL HEMORIDIAL GROUP                                                      B) - HEMORRHOID, RIGHT ANTERIOR HEMORIDIAL GROUP                                           FINAL DIAGNOSIS 12/12/2023    Final                    Value:A. HEMORRHOID:                           -- BENIGN HEMORRHOIDAL TISSUE.                                                    B. HEMORRHOID:                            -- BENIGN HEMORRHOIDAL TISSUE.      12/12/2023    Final                    Value:By the signature on this report, the individual or group listed as making                           the Final Interpretation/Diagnosis certifies that they have reviewed this                           case.     Clinical History 12/12/2023    Final                    Value:Pre-op diagnosis:                          Rectal bleeding [K62.5]                          Internal and external bleeding hemorrhoids [K64.4, K64.8]    Gross Description 12/12/2023    Final                    Value:A:  Received in formalin, labeled with the patient´s name and hospital                           number and \"left lateral hemorrhoidal\", is a segment of mucosa covered                           soft tissue measuring 2.2 x 1.4 x 1.1 cm.  The resection margin is inked.           " "                 A representative section is submitted in one cassette.                          DJO                           B:  Received in formalin, labeled with the patient´s name and hospital                           number and \"right anterior hemorrhoidal\", is a segment of mucosa covered                           soft tissue measuring 1.6 x 0.9 x 0.7 cm.  The resection margin is inked.                            A representative section is submitted in one cassette.                          DJO       Assessment/Plan   Problem List Items Addressed This Visit       Attention deficit hyperactivity disorder, predominantly inattentive type    Relevant Medications    amphetamine-dextroamphetamine (Adderall) 15 mg tablet (Start on 11/30/2024)    citalopram (CeleXA) 20 mg tablet    amphetamine-dextroamphetamine (Adderall) 15 mg tablet    amphetamine-dextroamphetamine (Adderall) 15 mg tablet (Start on 10/31/2024)    triamcinolone (Kenalog) 0.1 % cream     Other Visit Diagnoses       Rash and nonspecific skin eruption    -  Primary    Relevant Medications    triamcinolone (Kenalog) 0.1 % cream    Primary insomnia        Relevant Medications    traZODone (Desyrel) 50 mg tablet          I personally reviewed the OARRS report for this patient. I have considered the risks of abuse, dependence, addiction, and diversion. I believe that it is clinically appropriate for this patient to be prescribed this medication based on documented diagnosis.     Patient is doing well.  Refilled pts meds.  Follow up in 3 mo.  HPI  "

## 2024-12-19 ENCOUNTER — OFFICE VISIT (OUTPATIENT)
Dept: PRIMARY CARE | Facility: CLINIC | Age: 29
End: 2024-12-19
Payer: COMMERCIAL

## 2024-12-19 VITALS
BODY MASS INDEX: 20.77 KG/M2 | SYSTOLIC BLOOD PRESSURE: 114 MMHG | OXYGEN SATURATION: 98 % | WEIGHT: 121 LBS | HEART RATE: 86 BPM | TEMPERATURE: 98 F | DIASTOLIC BLOOD PRESSURE: 72 MMHG

## 2024-12-19 DIAGNOSIS — N39.0 URINARY TRACT INFECTION WITH HEMATURIA, SITE UNSPECIFIED: Primary | ICD-10-CM

## 2024-12-19 DIAGNOSIS — R35.0 URINARY FREQUENCY: ICD-10-CM

## 2024-12-19 DIAGNOSIS — R31.9 URINARY TRACT INFECTION WITH HEMATURIA, SITE UNSPECIFIED: Primary | ICD-10-CM

## 2024-12-19 LAB
POC APPEARANCE, URINE: ABNORMAL
POC BILIRUBIN, URINE: NEGATIVE
POC BLOOD, URINE: ABNORMAL
POC COLOR, URINE: ABNORMAL
POC GLUCOSE, URINE: NEGATIVE MG/DL
POC KETONES, URINE: NEGATIVE MG/DL
POC LEUKOCYTES, URINE: ABNORMAL
POC NITRITE,URINE: POSITIVE
POC PH, URINE: 6.5 PH
POC PROTEIN, URINE: ABNORMAL MG/DL
POC SPECIFIC GRAVITY, URINE: 1.02
POC UROBILINOGEN, URINE: 1 EU/DL

## 2024-12-19 PROCEDURE — 1036F TOBACCO NON-USER: CPT | Performed by: PHYSICIAN ASSISTANT

## 2024-12-19 PROCEDURE — 87186 SC STD MICRODIL/AGAR DIL: CPT

## 2024-12-19 PROCEDURE — 81003 URINALYSIS AUTO W/O SCOPE: CPT | Performed by: PHYSICIAN ASSISTANT

## 2024-12-19 PROCEDURE — 87086 URINE CULTURE/COLONY COUNT: CPT

## 2024-12-19 PROCEDURE — 99214 OFFICE O/P EST MOD 30 MIN: CPT | Performed by: PHYSICIAN ASSISTANT

## 2024-12-19 RX ORDER — SULFAMETHOXAZOLE AND TRIMETHOPRIM 800; 160 MG/1; MG/1
1 TABLET ORAL 2 TIMES DAILY
Qty: 14 TABLET | Refills: 0 | Status: SHIPPED | OUTPATIENT
Start: 2024-12-19 | End: 2024-12-26

## 2024-12-19 ASSESSMENT — ENCOUNTER SYMPTOMS
CHILLS: 0
FEVER: 0
ABDOMINAL PAIN: 0

## 2024-12-19 NOTE — PROGRESS NOTES
Subjective   Patient ID: Heena Varner is a 29 y.o. female who presents for Urinary Frequency (Urgency, cloudy urine, odor with urination, Low back pain x 5 deays).    HPI     Patient c/o UTI symptoms.   Has dysuria. Onset was gradual over the past 5 days. Has worsened since onset. No alleviating factors. Reports associated burning with urination, urinary frequency and urgency, but denies associated chills, costovertebral angle tenderness, fever, hematuria, incontinence, nausea and vomiting. Has some low back ache, especially at night.     LMP 2 weeks ago.      reports that she has never smoked. She has never used smokeless tobacco.      Review of Systems   Constitutional:  Negative for chills and fever.   Gastrointestinal:  Negative for abdominal pain.       Objective   /72   Pulse 86   Temp 36.7 °C (98 °F)   Wt 54.9 kg (121 lb)   SpO2 98%   BMI 20.77 kg/m²     Physical Exam  Vitals and nursing note reviewed.   Constitutional:       Appearance: Normal appearance. She is well-developed.   Eyes:      General: No scleral icterus.  Cardiovascular:      Rate and Rhythm: Normal rate and regular rhythm.   Pulmonary:      Effort: Pulmonary effort is normal.      Breath sounds: Normal breath sounds.   Abdominal:      Palpations: Abdomen is soft. There is no mass.      Tenderness: There is abdominal tenderness (mild suprapubic tenderness). There is no right CVA tenderness or left CVA tenderness.   Musculoskeletal:      Cervical back: Neck supple.   Skin:     General: Skin is warm and dry.   Neurological:      Mental Status: She is alert.       Office Visit on 12/19/2024   Component Date Value Ref Range Status    POC Color, Urine 12/19/2024 Dark Chika (A)  Straw, Yellow, Light-Yellow Final    POC Appearance, Urine 12/19/2024 Cloudy (A)  Clear Final    POC Glucose, Urine 12/19/2024 NEGATIVE  NEGATIVE mg/dl Final    POC Bilirubin, Urine 12/19/2024 NEGATIVE  NEGATIVE Final    POC Ketones, Urine 12/19/2024 NEGATIVE   NEGATIVE mg/dl Final    POC Specific Gravity, Urine 12/19/2024 1.025  1.005 - 1.035 Final    POC Blood, Urine 12/19/2024 SMALL (1+) (A)  NEGATIVE Final    POC PH, Urine 12/19/2024 6.5  No Reference Range Established PH Final    POC Protein, Urine 12/19/2024 30 (1+) (A)  NEGATIVE mg/dl Final    POC Urobilinogen, Urine 12/19/2024 1.0  0.2, 1.0 EU/DL Final    Poc Nitrite, Urine 12/19/2024 POSITIVE (A)  NEGATIVE Final    POC Leukocytes, Urine 12/19/2024 SMALL (1+) (A)  NEGATIVE Final       Assessment/Plan   Diagnoses and all orders for this visit:  Urinary tract infection with hematuria, site unspecified  -     Urine Culture  -     sulfamethoxazole-trimethoprim (Bactrim DS) 800-160 mg tablet; Take 1 tablet by mouth 2 times a day for 7 days.  Urinary frequency  -     POCT UA Automated manually resulted       Reviewed UA.  Send Urine Culture.   Rx bactrim.  Hydrate well.   Follow up if symptoms increase or persist.

## 2024-12-22 LAB — BACTERIA UR CULT: ABNORMAL

## 2024-12-29 DIAGNOSIS — F51.01 PRIMARY INSOMNIA: ICD-10-CM

## 2025-01-06 RX ORDER — TRAZODONE HYDROCHLORIDE 50 MG/1
TABLET ORAL
Qty: 90 TABLET | Refills: 0 | OUTPATIENT
Start: 2025-01-06

## 2025-01-07 ENCOUNTER — APPOINTMENT (OUTPATIENT)
Dept: PRIMARY CARE | Facility: CLINIC | Age: 30
End: 2025-01-07
Payer: COMMERCIAL

## 2025-01-07 VITALS
DIASTOLIC BLOOD PRESSURE: 70 MMHG | WEIGHT: 114 LBS | SYSTOLIC BLOOD PRESSURE: 104 MMHG | TEMPERATURE: 97.4 F | OXYGEN SATURATION: 98 % | HEART RATE: 72 BPM | BODY MASS INDEX: 19.57 KG/M2

## 2025-01-07 DIAGNOSIS — F90.0 ATTENTION DEFICIT HYPERACTIVITY DISORDER, PREDOMINANTLY INATTENTIVE TYPE: ICD-10-CM

## 2025-01-07 DIAGNOSIS — F51.01 PRIMARY INSOMNIA: ICD-10-CM

## 2025-01-07 PROCEDURE — 99213 OFFICE O/P EST LOW 20 MIN: CPT | Performed by: FAMILY MEDICINE

## 2025-01-07 PROCEDURE — 1036F TOBACCO NON-USER: CPT | Performed by: FAMILY MEDICINE

## 2025-01-07 RX ORDER — DEXTROAMPHETAMINE SACCHARATE, AMPHETAMINE ASPARTATE, DEXTROAMPHETAMINE SULFATE AND AMPHETAMINE SULFATE 3.75; 3.75; 3.75; 3.75 MG/1; MG/1; MG/1; MG/1
15 TABLET ORAL
Qty: 60 TABLET | Refills: 0 | Status: SHIPPED | OUTPATIENT
Start: 2025-02-07 | End: 2025-03-09

## 2025-01-07 RX ORDER — DEXTROAMPHETAMINE SACCHARATE, AMPHETAMINE ASPARTATE, DEXTROAMPHETAMINE SULFATE AND AMPHETAMINE SULFATE 3.75; 3.75; 3.75; 3.75 MG/1; MG/1; MG/1; MG/1
15 TABLET ORAL
Qty: 60 TABLET | Refills: 0 | Status: SHIPPED | OUTPATIENT
Start: 2025-03-06 | End: 2025-04-05

## 2025-01-07 RX ORDER — DEXTROAMPHETAMINE SACCHARATE, AMPHETAMINE ASPARTATE, DEXTROAMPHETAMINE SULFATE AND AMPHETAMINE SULFATE 3.75; 3.75; 3.75; 3.75 MG/1; MG/1; MG/1; MG/1
15 TABLET ORAL
Qty: 60 TABLET | Refills: 0 | Status: SHIPPED | OUTPATIENT
Start: 2025-01-07 | End: 2025-02-06

## 2025-01-07 RX ORDER — TRAZODONE HYDROCHLORIDE 50 MG/1
50 TABLET ORAL NIGHTLY
Qty: 90 TABLET | Refills: 1 | Status: SHIPPED | OUTPATIENT
Start: 2025-01-07

## 2025-01-07 NOTE — PROGRESS NOTES
Subjective   Patient ID: Heena Varner is a 29 y.o. female who presents for ADHD (recheck).    ADD: doing well.  Refilled meds.  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    Anxiety: well controlled      Current Outpatient Medications on File Prior to Visit   Medication Sig Dispense Refill    albuterol 2.5 mg /3 mL (0.083 %) nebulizer solution Take 3 mL (2.5 mg) by nebulization every 4 hours if needed (Asthma).      Aviane 0.1-20 mg-mcg tablet Take by mouth.      citalopram (CeleXA) 20 mg tablet Take 1 tablet (20 mg) by mouth once daily. 90 tablet 3    Clindagel 1 % gel, once daily Apply topically 2 times a day.      Drysol Dab-O-Matic 20 % external solution Apply topically once daily. apply to axilla      loratadine-pseudoephedrine (Loratadine-D)  mg 24 hr tablet Take 1 tablet by mouth once daily.      sulfacetamide sodium-sulfur (Avar, Plexion) 10-5 % (w/w) topical emulsion wash face twice daily as directed      tazarotene (Tazorac) 0.1 % gel apply to face every night sparingly      tretinoin (Retin-A) 0.1 % cream APPLY SPARINGLY TOPICALLY TO ENTIRE FACE EVERY NIGHT      triamcinolone (Kenalog) 0.1 % cream Apply topically 2 times a day. Apply to affected area 1-2 times daily as needed. 30 g 0    [DISCONTINUED] traZODone (Desyrel) 50 mg tablet Take 1 tablet (50 mg) by mouth once daily at bedtime. 90 tablet 0    amphetamine-dextroamphetamine (Adderall) 15 mg tablet Take 1 tablet (15 mg) by mouth 2 times daily (morning and midday). 60 tablet 0    amphetamine-dextroamphetamine (Adderall) 15 mg tablet Take 1 tablet (15 mg) by mouth 2 times daily (morning and midday). Do not fill before October 31, 2024. 60 tablet 0    [DISCONTINUED] amphetamine-dextroamphetamine (Adderall) 15 mg tablet Take 1 tablet (15 mg) by mouth 2 times daily (morning and midday). Do not fill before November 30, 2024. 60 tablet 0     No current facility-administered medications on file prior to visit.        Vitals:     01/07/25 1449   BP: 104/70   Pulse: 72   Temp: 36.3 °C (97.4 °F)   SpO2: 98%       Review of Systems   All other systems reviewed and are negative.      Objective     Physical Exam  Constitutional:       Appearance: Normal appearance. She is well-developed.   HENT:      Head: Atraumatic.   Cardiovascular:      Rate and Rhythm: Normal rate and regular rhythm.      Heart sounds: Normal heart sounds. No murmur heard.  Pulmonary:      Effort: Pulmonary effort is normal.      Breath sounds: Normal breath sounds.   Abdominal:      General: Bowel sounds are normal.      Palpations: Abdomen is soft.   Skin:     General: Skin is warm.   Neurological:      General: No focal deficit present.      Mental Status: She is alert.   Psychiatric:         Mood and Affect: Mood normal.         Office Visit on 12/19/2024   Component Date Value Ref Range Status    POC Color, Urine 12/19/2024 Dark Chika (A)  Straw, Yellow, Light-Yellow Final    POC Appearance, Urine 12/19/2024 Cloudy (A)  Clear Final    POC Glucose, Urine 12/19/2024 NEGATIVE  NEGATIVE mg/dl Final    POC Bilirubin, Urine 12/19/2024 NEGATIVE  NEGATIVE Final    POC Ketones, Urine 12/19/2024 NEGATIVE  NEGATIVE mg/dl Final    POC Specific Gravity, Urine 12/19/2024 1.025  1.005 - 1.035 Final    POC Blood, Urine 12/19/2024 SMALL (1+) (A)  NEGATIVE Final    POC PH, Urine 12/19/2024 6.5  No Reference Range Established PH Final    POC Protein, Urine 12/19/2024 30 (1+) (A)  NEGATIVE mg/dl Final    POC Urobilinogen, Urine 12/19/2024 1.0  0.2, 1.0 EU/DL Final    Poc Nitrite, Urine 12/19/2024 POSITIVE (A)  NEGATIVE Final    POC Leukocytes, Urine 12/19/2024 SMALL (1+) (A)  NEGATIVE Final    Urine Culture 12/19/2024 >=100,000 CFU/mL Escherichia coli (A)   Final       Assessment/Plan   Problem List Items Addressed This Visit       Attention deficit hyperactivity disorder, predominantly inattentive type    Relevant Medications    amphetamine-dextroamphetamine (Adderall) 15 mg tablet     amphetamine-dextroamphetamine (Adderall) 15 mg tablet (Start on 2/7/2025)    amphetamine-dextroamphetamine (Adderall) 15 mg tablet (Start on 3/6/2025)     Other Visit Diagnoses       Primary insomnia        Relevant Medications    traZODone (Desyrel) 50 mg tablet          I personally reviewed the OARRS report for this patient. I have considered the risks of abuse, dependence, addiction, and diversion. I believe that it is clinically appropriate for this patient to be prescribed this medication based on documented diagnosis.     Patient is doing well.  Refilled pts meds.  Follow up in 3 mo.  HPI

## 2025-01-30 ENCOUNTER — APPOINTMENT (OUTPATIENT)
Dept: CARDIOLOGY | Facility: HOSPITAL | Age: 30
End: 2025-01-30
Payer: COMMERCIAL

## 2025-01-30 ENCOUNTER — APPOINTMENT (OUTPATIENT)
Dept: RADIOLOGY | Facility: HOSPITAL | Age: 30
End: 2025-01-30
Payer: COMMERCIAL

## 2025-01-30 ENCOUNTER — HOSPITAL ENCOUNTER (EMERGENCY)
Facility: HOSPITAL | Age: 30
Discharge: HOME | End: 2025-01-30
Attending: EMERGENCY MEDICINE
Payer: COMMERCIAL

## 2025-01-30 VITALS
DIASTOLIC BLOOD PRESSURE: 77 MMHG | RESPIRATION RATE: 18 BRPM | OXYGEN SATURATION: 100 % | BODY MASS INDEX: 19.99 KG/M2 | SYSTOLIC BLOOD PRESSURE: 126 MMHG | HEIGHT: 65 IN | TEMPERATURE: 98 F | HEART RATE: 81 BPM | WEIGHT: 120 LBS

## 2025-01-30 DIAGNOSIS — R42 DIZZINESS: Primary | ICD-10-CM

## 2025-01-30 DIAGNOSIS — R07.9 CHEST PAIN, UNSPECIFIED TYPE: ICD-10-CM

## 2025-01-30 LAB
ALBUMIN SERPL BCP-MCNC: 4.6 G/DL (ref 3.4–5)
ALP SERPL-CCNC: 41 U/L (ref 33–110)
ALT SERPL W P-5'-P-CCNC: 11 U/L (ref 7–45)
ANION GAP SERPL CALC-SCNC: 8 MMOL/L (ref 10–20)
APPEARANCE UR: CLEAR
AST SERPL W P-5'-P-CCNC: 13 U/L (ref 9–39)
BACTERIA #/AREA URNS AUTO: ABNORMAL /HPF
BASOPHILS # BLD AUTO: 0.04 X10*3/UL (ref 0–0.1)
BASOPHILS NFR BLD AUTO: 0.6 %
BILIRUB SERPL-MCNC: 0.3 MG/DL (ref 0–1.2)
BILIRUB UR STRIP.AUTO-MCNC: NEGATIVE MG/DL
BUN SERPL-MCNC: 15 MG/DL (ref 6–23)
CALCIUM SERPL-MCNC: 9.1 MG/DL (ref 8.6–10.3)
CARDIAC TROPONIN I PNL SERPL HS: <3 NG/L (ref 0–13)
CHLORIDE SERPL-SCNC: 107 MMOL/L (ref 98–107)
CO2 SERPL-SCNC: 27 MMOL/L (ref 21–32)
COLOR UR: ABNORMAL
CREAT SERPL-MCNC: 0.76 MG/DL (ref 0.5–1.05)
D DIMER PPP FEU-MCNC: <215 NG/ML FEU
EGFRCR SERPLBLD CKD-EPI 2021: >90 ML/MIN/1.73M*2
EOSINOPHIL # BLD AUTO: 0.21 X10*3/UL (ref 0–0.7)
EOSINOPHIL NFR BLD AUTO: 3.3 %
ERYTHROCYTE [DISTWIDTH] IN BLOOD BY AUTOMATED COUNT: 12.8 % (ref 11.5–14.5)
GLUCOSE SERPL-MCNC: 92 MG/DL (ref 74–99)
GLUCOSE UR STRIP.AUTO-MCNC: NORMAL MG/DL
HCG UR QL IA.RAPID: NEGATIVE
HCT VFR BLD AUTO: 37.3 % (ref 36–46)
HGB BLD-MCNC: 12.4 G/DL (ref 12–16)
IMM GRANULOCYTES # BLD AUTO: 0.01 X10*3/UL (ref 0–0.7)
IMM GRANULOCYTES NFR BLD AUTO: 0.2 % (ref 0–0.9)
KETONES UR STRIP.AUTO-MCNC: NEGATIVE MG/DL
LEUKOCYTE ESTERASE UR QL STRIP.AUTO: NEGATIVE
LYMPHOCYTES # BLD AUTO: 2.46 X10*3/UL (ref 1.2–4.8)
LYMPHOCYTES NFR BLD AUTO: 38.2 %
MCH RBC QN AUTO: 29.7 PG (ref 26–34)
MCHC RBC AUTO-ENTMCNC: 33.2 G/DL (ref 32–36)
MCV RBC AUTO: 89 FL (ref 80–100)
MONOCYTES # BLD AUTO: 0.45 X10*3/UL (ref 0.1–1)
MONOCYTES NFR BLD AUTO: 7 %
MUCOUS THREADS #/AREA URNS AUTO: ABNORMAL /LPF
NEUTROPHILS # BLD AUTO: 3.27 X10*3/UL (ref 1.2–7.7)
NEUTROPHILS NFR BLD AUTO: 50.7 %
NITRITE UR QL STRIP.AUTO: NEGATIVE
NRBC BLD-RTO: 0 /100 WBCS (ref 0–0)
PH UR STRIP.AUTO: 6.5 [PH]
PLATELET # BLD AUTO: 271 X10*3/UL (ref 150–450)
POTASSIUM SERPL-SCNC: 3.9 MMOL/L (ref 3.5–5.3)
PROT SERPL-MCNC: 7.1 G/DL (ref 6.4–8.2)
PROT UR STRIP.AUTO-MCNC: ABNORMAL MG/DL
RBC # BLD AUTO: 4.18 X10*6/UL (ref 4–5.2)
RBC # UR STRIP.AUTO: ABNORMAL /UL
RBC #/AREA URNS AUTO: ABNORMAL /HPF
SODIUM SERPL-SCNC: 138 MMOL/L (ref 136–145)
SP GR UR STRIP.AUTO: 1.03
SQUAMOUS #/AREA URNS AUTO: ABNORMAL /HPF
UROBILINOGEN UR STRIP.AUTO-MCNC: ABNORMAL MG/DL
WBC # BLD AUTO: 6.4 X10*3/UL (ref 4.4–11.3)
WBC #/AREA URNS AUTO: ABNORMAL /HPF

## 2025-01-30 PROCEDURE — 93005 ELECTROCARDIOGRAM TRACING: CPT

## 2025-01-30 PROCEDURE — 36415 COLL VENOUS BLD VENIPUNCTURE: CPT | Performed by: EMERGENCY MEDICINE

## 2025-01-30 PROCEDURE — 84484 ASSAY OF TROPONIN QUANT: CPT | Performed by: EMERGENCY MEDICINE

## 2025-01-30 PROCEDURE — 71046 X-RAY EXAM CHEST 2 VIEWS: CPT

## 2025-01-30 PROCEDURE — 81001 URINALYSIS AUTO W/SCOPE: CPT | Performed by: EMERGENCY MEDICINE

## 2025-01-30 PROCEDURE — 84075 ASSAY ALKALINE PHOSPHATASE: CPT | Performed by: EMERGENCY MEDICINE

## 2025-01-30 PROCEDURE — 2500000004 HC RX 250 GENERAL PHARMACY W/ HCPCS (ALT 636 FOR OP/ED)

## 2025-01-30 PROCEDURE — 70450 CT HEAD/BRAIN W/O DYE: CPT | Performed by: RADIOLOGY

## 2025-01-30 PROCEDURE — 96375 TX/PRO/DX INJ NEW DRUG ADDON: CPT

## 2025-01-30 PROCEDURE — 99285 EMERGENCY DEPT VISIT HI MDM: CPT | Mod: 25 | Performed by: EMERGENCY MEDICINE

## 2025-01-30 PROCEDURE — 71046 X-RAY EXAM CHEST 2 VIEWS: CPT | Performed by: RADIOLOGY

## 2025-01-30 PROCEDURE — 85379 FIBRIN DEGRADATION QUANT: CPT | Performed by: EMERGENCY MEDICINE

## 2025-01-30 PROCEDURE — 85025 COMPLETE CBC W/AUTO DIFF WBC: CPT | Performed by: EMERGENCY MEDICINE

## 2025-01-30 PROCEDURE — 2500000004 HC RX 250 GENERAL PHARMACY W/ HCPCS (ALT 636 FOR OP/ED): Performed by: EMERGENCY MEDICINE

## 2025-01-30 PROCEDURE — 81025 URINE PREGNANCY TEST: CPT | Performed by: EMERGENCY MEDICINE

## 2025-01-30 PROCEDURE — 70450 CT HEAD/BRAIN W/O DYE: CPT

## 2025-01-30 PROCEDURE — 96374 THER/PROPH/DIAG INJ IV PUSH: CPT

## 2025-01-30 RX ORDER — KETOROLAC TROMETHAMINE 30 MG/ML
15 INJECTION, SOLUTION INTRAMUSCULAR; INTRAVENOUS ONCE
Status: COMPLETED | OUTPATIENT
Start: 2025-01-30 | End: 2025-01-30

## 2025-01-30 RX ORDER — KETOROLAC TROMETHAMINE 30 MG/ML
INJECTION, SOLUTION INTRAMUSCULAR; INTRAVENOUS
Status: COMPLETED
Start: 2025-01-30 | End: 2025-01-30

## 2025-01-30 RX ORDER — ONDANSETRON HYDROCHLORIDE 2 MG/ML
4 INJECTION, SOLUTION INTRAVENOUS ONCE
Status: COMPLETED | OUTPATIENT
Start: 2025-01-30 | End: 2025-01-30

## 2025-01-30 RX ADMIN — ONDANSETRON 4 MG: 2 INJECTION INTRAMUSCULAR; INTRAVENOUS at 16:42

## 2025-01-30 RX ADMIN — KETOROLAC TROMETHAMINE 15 MG: 30 INJECTION, SOLUTION INTRAMUSCULAR; INTRAVENOUS at 18:02

## 2025-01-30 RX ADMIN — KETOROLAC TROMETHAMINE 15 MG: 30 INJECTION, SOLUTION INTRAMUSCULAR at 18:02

## 2025-01-30 ASSESSMENT — COLUMBIA-SUICIDE SEVERITY RATING SCALE - C-SSRS
2. HAVE YOU ACTUALLY HAD ANY THOUGHTS OF KILLING YOURSELF?: NO
6. HAVE YOU EVER DONE ANYTHING, STARTED TO DO ANYTHING, OR PREPARED TO DO ANYTHING TO END YOUR LIFE?: NO
1. IN THE PAST MONTH, HAVE YOU WISHED YOU WERE DEAD OR WISHED YOU COULD GO TO SLEEP AND NOT WAKE UP?: NO

## 2025-01-30 ASSESSMENT — PAIN - FUNCTIONAL ASSESSMENT: PAIN_FUNCTIONAL_ASSESSMENT: 0-10

## 2025-01-30 ASSESSMENT — PAIN SCALES - GENERAL: PAINLEVEL_OUTOF10: 0 - NO PAIN

## 2025-01-30 NOTE — ED PROVIDER NOTES
HPI   Chief Complaint   Patient presents with    Dizziness    Nausea    Headache    Lethargy       Patient presents to the emergency department multiple complaints.  Patient states that yesterday she had a bad headache.  After that she had an episode of what sounds like a presyncopal episode.  She felt lightheaded dizzy hot and had numbness in both her arms.  Today she just feels off.  Her family says that she is spacing out.  She has no headache today.  She has chest pressure.  It is not pleuritic.  No recent travel or surgery.  Patient denies pregnancy.  She does report a history of aortic stenosis.  She states the last time she had an echocardiogram she said that she was told she did not need to come back.  Patient is on medication for anxiety and ADHD.  She denies cough or congestion.  No fever.  No neck pain.  No nausea or vomiting.  No change in bowel movements.  No change in urination.  No other complaints at this time.                          Chet Coma Scale Score: 15                  Patient History   Past Medical History:   Diagnosis Date    Acute pharyngitis 03/07/2023    Personal history of other diseases of the circulatory system     History of Raynaud's syndrome    Personal history of other diseases of the digestive system     History of irritable bowel syndrome    Personal history of other diseases of the respiratory system     History of asthma    Personal history of other drug therapy     COVID-19 vaccine series completed     Past Surgical History:   Procedure Laterality Date    COLONOSCOPY  09/12/2017    Complete Colonoscopy    EXCISIONAL HEMORRHOIDECTOMY      OTHER SURGICAL HISTORY  09/07/2017    Oral Surgery Tooth Extraction [place] [facility]     Family History   Problem Relation Name Age of Onset    Arthritis Mother Carley Villegas     Depression Mother Carley Villegas     Alcohol abuse Father Gene Ramirez     Depression Father Gene Ramirez     Depression Brother Mitchell albright     Heart  disease Mother's Brother Aldo Varner      Social History     Tobacco Use    Smoking status: Never    Smokeless tobacco: Never   Vaping Use    Vaping status: Some Days   Substance Use Topics    Alcohol use: Not Currently    Drug use: Never       Physical Exam   ED Triage Vitals [01/30/25 1449]   Temperature Heart Rate Respirations BP   36.7 °C (98.1 °F) (!) 103 18 143/87      Pulse Ox Temp Source Heart Rate Source Patient Position   98 % Skin Monitor --      BP Location FiO2 (%)     -- --       Physical Exam  Vitals and nursing note reviewed.   Constitutional:       Appearance: Normal appearance. She is well-developed.   HENT:      Head: Normocephalic and atraumatic.      Right Ear: Tympanic membrane normal.      Left Ear: Tympanic membrane normal.      Nose: Nose normal.      Mouth/Throat:      Mouth: Mucous membranes are moist.   Eyes:      Extraocular Movements: Extraocular movements intact.      Pupils: Pupils are equal, round, and reactive to light.   Cardiovascular:      Rate and Rhythm: Normal rate and regular rhythm.      Pulses: Normal pulses.      Heart sounds: Normal heart sounds.   Pulmonary:      Effort: Pulmonary effort is normal.      Breath sounds: Normal breath sounds.   Abdominal:      General: Abdomen is flat. Bowel sounds are normal.      Palpations: Abdomen is soft.   Musculoskeletal:         General: Normal range of motion.      Cervical back: Normal range of motion and neck supple. No rigidity.   Skin:     General: Skin is warm and dry.      Capillary Refill: Capillary refill takes less than 2 seconds.   Neurological:      General: No focal deficit present.      Mental Status: She is alert and oriented to person, place, and time.      Cranial Nerves: No cranial nerve deficit, dysarthria or facial asymmetry.      Sensory: No sensory deficit.      Motor: No weakness.   Psychiatric:         Mood and Affect: Mood normal.         Behavior: Behavior normal.       Labs Reviewed   CBC WITH AUTO  DIFFERENTIAL   COMPREHENSIVE METABOLIC PANEL   TROPONIN I, HIGH SENSITIVITY   D-DIMER, VTE EXCLUSION   URINALYSIS WITH REFLEX CULTURE AND MICROSCOPIC    Narrative:     The following orders were created for panel order Urinalysis with Reflex Culture and Microscopic.  Procedure                               Abnormality         Status                     ---------                               -----------         ------                     Urinalysis with Reflex C...[[id number]]                                                 Extra Urine Gray Tube[[id number]]                                                         Please view results for these tests on the individual orders.   HCG, URINE, QUALITATIVE   URINALYSIS WITH REFLEX CULTURE AND MICROSCOPIC   EXTRA URINE GRAY TUBE     Pain Management Panel          Latest Ref Rng & Units 9/23/2021   Pain Management Panel   Amphetamine Screen, Urine NEGATIVE PRESUMPTIVE NEGATIVE    Barbiturate Screen, Urine NEGATIVE PRESUMPTIVE NEGATIVE    Fentanyl Screen, Urine NEGATIVE PRESUMPTIVE NEGATIVE    Methadone Screen, Urine NEGATIVE PRESUMPTIVE NEGATIVE      CT head wo IV contrast    (Results Pending)   XR chest 1 view    (Results Pending)     ED Course & MDM   Diagnoses as of 01/30/25 1838   Dizziness   Chest pain, unspecified type       Medical Decision Making  Patient presents secondary to chest discomfort.  Patient is evaluated in the emergency department for pneumonia, PE, coronary disease, dysrhythmia or other acute cause.  Patient is evaluated in the emergency department with EKG, chest x-ray, CT head is performed to evaluate for her confusional symptoms.  Laboratory workup includes troponin CBC CMP urinalysis and urine hCG.  Laboratory workup is unremarkable.  D-dimer is negative.  Troponin is negative.  CT head is negative for acute process.  Chest x-ray is negative for acute process.  Patient at this time is hemodynamically stable.  EKG was obtained at 1459.  It is sinus  rhythm rate of 85.  No acute ST elevation.  Wandering baseline.  VA interval is 120 and QTc is 433.  Only 1 EKG was obtained during her visit today.  This is interpreted by myself.  At this time patient does not meet criteria for further testing in the emergency department.  She is discharged to follow-up with her primary care physician or return here for new or worsening symptoms.        Procedure  Procedures     Lacie Garcia MD  01/30/25 5992

## 2025-01-31 ENCOUNTER — PATIENT OUTREACH (OUTPATIENT)
Dept: CARE COORDINATION | Facility: CLINIC | Age: 30
End: 2025-01-31
Payer: COMMERCIAL

## 2025-01-31 LAB
ATRIAL RATE: 85 BPM
HOLD SPECIMEN: NORMAL
P AXIS: 84 DEGREES
PR INTERVAL: 120 MS
Q ONSET: 249 MS
QRS COUNT: 14 BEATS
QRS DURATION: 77 MS
QT INTERVAL: 364 MS
QTC CALCULATION(BAZETT): 433 MS
QTC FREDERICIA: 408 MS
R AXIS: 76 DEGREES
T AXIS: 8 DEGREES
T OFFSET: 431 MS
VENTRICULAR RATE: 85 BPM

## 2025-01-31 NOTE — PROGRESS NOTES
Outreach call to patient to support a smooth transition of care from recent admission.  Unable to leave a voicemail message for patient with my contact information.    Shayy Lofton RN, Care Manager  [facility], [facility]  [phone]

## 2025-02-25 LAB
ATRIAL RATE: 85 BPM
P AXIS: 84 DEGREES
PR INTERVAL: 120 MS
Q ONSET: 249 MS
QRS COUNT: 14 BEATS
QRS DURATION: 77 MS
QT INTERVAL: 364 MS
QTC CALCULATION(BAZETT): 433 MS
QTC FREDERICIA: 408 MS
R AXIS: 76 DEGREES
T AXIS: 8 DEGREES
T OFFSET: 431 MS
VENTRICULAR RATE: 85 BPM

## 2025-04-07 ENCOUNTER — APPOINTMENT (OUTPATIENT)
Dept: PRIMARY CARE | Facility: CLINIC | Age: 30
End: 2025-04-07
Payer: COMMERCIAL

## 2025-06-25 ENCOUNTER — OFFICE VISIT (OUTPATIENT)
Dept: PRIMARY CARE | Facility: CLINIC | Age: 30
End: 2025-06-25
Payer: COMMERCIAL

## 2025-06-25 VITALS
DIASTOLIC BLOOD PRESSURE: 64 MMHG | HEART RATE: 101 BPM | HEIGHT: 65 IN | SYSTOLIC BLOOD PRESSURE: 116 MMHG | BODY MASS INDEX: 20.16 KG/M2 | WEIGHT: 121 LBS | OXYGEN SATURATION: 98 % | TEMPERATURE: 97.6 F

## 2025-06-25 DIAGNOSIS — R35.0 FREQUENCY OF URINATION: Primary | ICD-10-CM

## 2025-06-25 DIAGNOSIS — F90.0 ATTENTION DEFICIT HYPERACTIVITY DISORDER, PREDOMINANTLY INATTENTIVE TYPE: ICD-10-CM

## 2025-06-25 DIAGNOSIS — Z00.00 ROUTINE ADULT HEALTH MAINTENANCE: ICD-10-CM

## 2025-06-25 DIAGNOSIS — F51.01 PRIMARY INSOMNIA: ICD-10-CM

## 2025-06-25 LAB
POC APPEARANCE, URINE: CLEAR
POC BILIRUBIN, URINE: NEGATIVE
POC BLOOD, URINE: NEGATIVE
POC COLOR, URINE: YELLOW
POC GLUCOSE, URINE: NEGATIVE MG/DL
POC KETONES, URINE: NEGATIVE MG/DL
POC LEUKOCYTES, URINE: NEGATIVE
POC NITRITE,URINE: NEGATIVE
POC PH, URINE: 8 PH
POC PROTEIN, URINE: NEGATIVE MG/DL
POC SPECIFIC GRAVITY, URINE: 1.02
POC UROBILINOGEN, URINE: 0.2 EU/DL

## 2025-06-25 PROCEDURE — 99395 PREV VISIT EST AGE 18-39: CPT | Performed by: FAMILY MEDICINE

## 2025-06-25 PROCEDURE — 81003 URINALYSIS AUTO W/O SCOPE: CPT | Performed by: FAMILY MEDICINE

## 2025-06-25 PROCEDURE — 99213 OFFICE O/P EST LOW 20 MIN: CPT | Performed by: FAMILY MEDICINE

## 2025-06-25 PROCEDURE — 3008F BODY MASS INDEX DOCD: CPT | Performed by: FAMILY MEDICINE

## 2025-06-25 PROCEDURE — 1036F TOBACCO NON-USER: CPT | Performed by: FAMILY MEDICINE

## 2025-06-25 RX ORDER — AZELAIC ACID 0.15 G/G
GEL TOPICAL
COMMUNITY
Start: 2025-05-21

## 2025-06-25 RX ORDER — DEXTROAMPHETAMINE SACCHARATE, AMPHETAMINE ASPARTATE, DEXTROAMPHETAMINE SULFATE AND AMPHETAMINE SULFATE 3.75; 3.75; 3.75; 3.75 MG/1; MG/1; MG/1; MG/1
15 TABLET ORAL
Qty: 60 TABLET | Refills: 0 | Status: SHIPPED | OUTPATIENT
Start: 2025-08-24 | End: 2025-09-23

## 2025-06-25 RX ORDER — TRAZODONE HYDROCHLORIDE 50 MG/1
50 TABLET ORAL NIGHTLY
Qty: 90 TABLET | Refills: 1 | Status: SHIPPED | OUTPATIENT
Start: 2025-06-25

## 2025-06-25 RX ORDER — DEXTROAMPHETAMINE SACCHARATE, AMPHETAMINE ASPARTATE, DEXTROAMPHETAMINE SULFATE AND AMPHETAMINE SULFATE 3.75; 3.75; 3.75; 3.75 MG/1; MG/1; MG/1; MG/1
15 TABLET ORAL
Qty: 60 TABLET | Refills: 0 | Status: SHIPPED | OUTPATIENT
Start: 2025-07-25 | End: 2025-08-24

## 2025-06-25 RX ORDER — CITALOPRAM 20 MG/1
20 TABLET ORAL DAILY
Qty: 90 TABLET | Refills: 3 | Status: SHIPPED | OUTPATIENT
Start: 2025-06-25 | End: 2026-06-25

## 2025-06-25 RX ORDER — DEXTROAMPHETAMINE SACCHARATE, AMPHETAMINE ASPARTATE, DEXTROAMPHETAMINE SULFATE AND AMPHETAMINE SULFATE 3.75; 3.75; 3.75; 3.75 MG/1; MG/1; MG/1; MG/1
15 TABLET ORAL
Qty: 60 TABLET | Refills: 0 | Status: SHIPPED | OUTPATIENT
Start: 2025-06-25 | End: 2025-07-25

## 2025-06-25 ASSESSMENT — PROMIS GLOBAL HEALTH SCALE
RATE_QUALITY_OF_LIFE: EXCELLENT
RATE_GENERAL_HEALTH: VERY GOOD
RATE_SOCIAL_SATISFACTION: GOOD
RATE_MENTAL_HEALTH: GOOD
RATE_PHYSICAL_HEALTH: VERY GOOD
EMOTIONAL_PROBLEMS: SOMETIMES
CARRYOUT_PHYSICAL_ACTIVITIES: COMPLETELY
CARRYOUT_SOCIAL_ACTIVITIES: GOOD
RATE_AVERAGE_PAIN: 0
RATE_AVERAGE_FATIGUE: MODERATE

## 2025-06-25 NOTE — PROGRESS NOTES
Subjective   Patient ID: Heena Varner is a 30 y.o. female who presents for Annual Exam (cpe) and Urinary Frequency (X 1 week).    ADD: doing well.  Refilled meds.  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    Anxiety: well controlled    Social History     Socioeconomic History    Marital status: Single     Spouse name: Not on file    Number of children: Not on file    Years of education: Not on file    Highest education level: Not on file   Occupational History    Not on file   Tobacco Use    Smoking status: Never    Smokeless tobacco: Never   Vaping Use    Vaping status: Some Days   Substance and Sexual Activity    Alcohol use: Not Currently    Drug use: Never    Sexual activity: Yes     Partners: Male     Birth control/protection: Other   Other Topics Concern    Not on file   Social History Narrative    Not on file     Social Drivers of Health     Financial Resource Strain: Not on file   Food Insecurity: Not on file   Transportation Needs: Not on file   Physical Activity: Not on file   Stress: Not on file   Social Connections: Not on file   Intimate Partner Violence: Not on file   Housing Stability: Not on file         Current Outpatient Medications on File Prior to Visit   Medication Sig Dispense Refill    albuterol 2.5 mg /3 mL (0.083 %) nebulizer solution Take 3 mL (2.5 mg) by nebulization every 4 hours if needed (Asthma).      Aviane 0.1-20 mg-mcg tablet Take by mouth.      azelaic acid (Finacea) 15 % gel       Clindagel 1 % gel, once daily Apply topically 2 times a day.      Drysol Dab-O-Matic 20 % external solution Apply topically once daily. apply to axilla      loratadine-pseudoephedrine (Loratadine-D)  mg 24 hr tablet Take 1 tablet by mouth once daily.      sulfacetamide sodium-sulfur (Avar, Plexion) 10-5 % (w/w) topical emulsion wash face twice daily as directed      tazarotene (Tazorac) 0.1 % gel apply to face every night sparingly      tretinoin (Retin-A) 0.1 % cream APPLY  SPARINGLY TOPICALLY TO ENTIRE FACE EVERY NIGHT      triamcinolone (Kenalog) 0.1 % cream Apply topically 2 times a day. Apply to affected area 1-2 times daily as needed. 30 g 0    [DISCONTINUED] citalopram (CeleXA) 20 mg tablet Take 1 tablet (20 mg) by mouth once daily. 90 tablet 3    [DISCONTINUED] traZODone (Desyrel) 50 mg tablet Take 1 tablet (50 mg) by mouth once daily at bedtime. 90 tablet 1    [DISCONTINUED] amphetamine-dextroamphetamine (Adderall) 15 mg tablet Take 1 tablet (15 mg) by mouth 2 times daily (morning and midday). 60 tablet 0    [DISCONTINUED] amphetamine-dextroamphetamine (Adderall) 15 mg tablet Take 1 tablet (15 mg) by mouth 2 times daily (morning and midday). Do not fill before October 31, 2024. 60 tablet 0    [DISCONTINUED] amphetamine-dextroamphetamine (Adderall) 15 mg tablet Take 1 tablet (15 mg) by mouth 2 times daily (morning and midday). 60 tablet 0    [DISCONTINUED] amphetamine-dextroamphetamine (Adderall) 15 mg tablet Take 1 tablet (15 mg) by mouth 2 times daily (morning and midday). Do not fill before February 7, 2025. 60 tablet 0    [DISCONTINUED] amphetamine-dextroamphetamine (Adderall) 15 mg tablet Take 1 tablet (15 mg) by mouth 2 times daily (morning and midday). Do not fill before March 6, 2025. 60 tablet 0     No current facility-administered medications on file prior to visit.        Vitals:    06/25/25 1007   BP: 116/64   Pulse: 101   Temp: 36.4 °C (97.6 °F)   SpO2: 98%       Review of Systems   All other systems reviewed and are negative.      Objective     Physical Exam  Constitutional:       Appearance: Normal appearance. She is well-developed.   HENT:      Head: Atraumatic.   Cardiovascular:      Rate and Rhythm: Normal rate and regular rhythm.      Heart sounds: Normal heart sounds. No murmur heard.  Pulmonary:      Effort: Pulmonary effort is normal.      Breath sounds: Normal breath sounds.   Abdominal:      General: Bowel sounds are normal.      Palpations: Abdomen is  soft.   Skin:     General: Skin is warm.   Neurological:      General: No focal deficit present.      Mental Status: She is alert.   Psychiatric:         Mood and Affect: Mood normal.         Office Visit on 06/25/2025   Component Date Value Ref Range Status    POC Color, Urine 06/25/2025 Yellow  Straw, Yellow, Light-Yellow Final    POC Appearance, Urine 06/25/2025 Clear  Clear Final    POC Glucose, Urine 06/25/2025 NEGATIVE  NEGATIVE mg/dl Final    POC Bilirubin, Urine 06/25/2025 NEGATIVE  NEGATIVE Final    POC Ketones, Urine 06/25/2025 NEGATIVE  NEGATIVE mg/dl Final    POC Specific Gravity, Urine 06/25/2025 1.020  1.005 - 1.035 Final    POC Blood, Urine 06/25/2025 NEGATIVE  NEGATIVE Final    POC PH, Urine 06/25/2025 8.0  No Reference Range Established PH Final    POC Protein, Urine 06/25/2025 NEGATIVE  NEGATIVE mg/dl Final    POC Urobilinogen, Urine 06/25/2025 0.2  0.2, 1.0 EU/DL Final    Poc Nitrite, Urine 06/25/2025 NEGATIVE  NEGATIVE Final    POC Leukocytes, Urine 06/25/2025 NEGATIVE  NEGATIVE Final       Assessment/Plan   Problem List Items Addressed This Visit       Attention deficit hyperactivity disorder, predominantly inattentive type    Relevant Medications    amphetamine-dextroamphetamine (Adderall) 15 mg tablet    amphetamine-dextroamphetamine (Adderall) 15 mg tablet (Start on 7/25/2025)    amphetamine-dextroamphetamine (Adderall) 15 mg tablet (Start on 8/24/2025)    citalopram (CeleXA) 20 mg tablet     Other Visit Diagnoses         Frequency of urination    -  Primary    Relevant Medications    amphetamine-dextroamphetamine (Adderall) 15 mg tablet    amphetamine-dextroamphetamine (Adderall) 15 mg tablet (Start on 7/25/2025)    amphetamine-dextroamphetamine (Adderall) 15 mg tablet (Start on 8/24/2025)    Other Relevant Orders    POCT UA Automated manually resulted (Completed)      Primary insomnia        Relevant Medications    traZODone (Desyrel) 50 mg tablet      Routine adult health maintenance               I personally reviewed the [facility] report for this patient. I have considered the risks of abuse, dependence, addiction, and diversion. I believe that it is clinically appropriate for this patient to be prescribed this medication based on documented diagnosis.     Patient is doing well.  Refilled pts meds.  Follow up in 3 mo.

## 2025-09-23 ENCOUNTER — APPOINTMENT (OUTPATIENT)
Dept: PRIMARY CARE | Facility: CLINIC | Age: 30
End: 2025-09-23
Payer: COMMERCIAL

## (undated) DEVICE — LUBRICANT, SURGICAL, FOIL PACK, STERILE, 5 GRAM

## (undated) DEVICE — Device

## (undated) DEVICE — LIGASURE, CURVED, SMALL JAW

## (undated) DEVICE — SPONGE, HEMOSTATIC, GELATIN, SURGIFOAM, 8 X 13CM

## (undated) DEVICE — COVER HANDLE LIGHT, STERIS, BLUE, STERILE

## (undated) DEVICE — NEEDLE, SAFETY, 25 GA X 1.5 IN

## (undated) DEVICE — PANTY, MESH, X-LARGE, GREEN